# Patient Record
Sex: FEMALE | HISPANIC OR LATINO | Employment: FULL TIME | URBAN - METROPOLITAN AREA
[De-identification: names, ages, dates, MRNs, and addresses within clinical notes are randomized per-mention and may not be internally consistent; named-entity substitution may affect disease eponyms.]

---

## 2017-03-19 ENCOUNTER — APPOINTMENT (OUTPATIENT)
Dept: GENERAL RADIOLOGY | Facility: CLINIC | Age: 42
End: 2017-03-19
Attending: EMERGENCY MEDICINE

## 2017-03-19 ENCOUNTER — HOSPITAL ENCOUNTER (EMERGENCY)
Facility: CLINIC | Age: 42
Discharge: HOME | End: 2017-03-19
Attending: EMERGENCY MEDICINE | Admitting: EMERGENCY MEDICINE

## 2017-03-19 VITALS
DIASTOLIC BLOOD PRESSURE: 77 MMHG | OXYGEN SATURATION: 99 % | RESPIRATION RATE: 48 BRPM | SYSTOLIC BLOOD PRESSURE: 109 MMHG | TEMPERATURE: 101.3 F | HEART RATE: 107 BPM

## 2017-03-19 DIAGNOSIS — J06.9 VIRAL UPPER RESPIRATORY TRACT INFECTION: ICD-10-CM

## 2017-03-19 LAB
ALBUMIN SERPL-MCNC: 3.8 G/DL (ref 3.4–5)
ALBUMIN UR-MCNC: NEGATIVE MG/DL
ALP SERPL-CCNC: 53 U/L (ref 40–150)
ALT SERPL W P-5'-P-CCNC: 16 U/L (ref 0–50)
ANION GAP SERPL CALCULATED.3IONS-SCNC: 8 MMOL/L (ref 3–14)
APPEARANCE UR: CLEAR
AST SERPL W P-5'-P-CCNC: 15 U/L (ref 0–45)
BACTERIA #/AREA URNS HPF: ABNORMAL /HPF
BASOPHILS # BLD AUTO: 0 10E9/L (ref 0–0.2)
BASOPHILS NFR BLD AUTO: 0.3 %
BILIRUB SERPL-MCNC: 1.7 MG/DL (ref 0.2–1.3)
BILIRUB UR QL STRIP: NEGATIVE
BUN SERPL-MCNC: 7 MG/DL (ref 7–30)
CALCIUM SERPL-MCNC: 8.4 MG/DL (ref 8.5–10.1)
CHLORIDE SERPL-SCNC: 105 MMOL/L (ref 94–109)
CO2 SERPL-SCNC: 24 MMOL/L (ref 20–32)
COLOR UR AUTO: YELLOW
CREAT SERPL-MCNC: 0.6 MG/DL (ref 0.52–1.04)
DIFFERENTIAL METHOD BLD: ABNORMAL
EOSINOPHIL # BLD AUTO: 0 10E9/L (ref 0–0.7)
EOSINOPHIL NFR BLD AUTO: 0.1 %
ERYTHROCYTE [DISTWIDTH] IN BLOOD BY AUTOMATED COUNT: 18.6 % (ref 10–15)
FLUAV+FLUBV AG SPEC QL: NEGATIVE
FLUAV+FLUBV AG SPEC QL: NORMAL
GFR SERPL CREATININE-BSD FRML MDRD: ABNORMAL ML/MIN/1.7M2
GLUCOSE SERPL-MCNC: 98 MG/DL (ref 70–99)
GLUCOSE UR STRIP-MCNC: NEGATIVE MG/DL
HCT VFR BLD AUTO: 40.4 % (ref 35–47)
HGB BLD-MCNC: 13.1 G/DL (ref 11.7–15.7)
HGB UR QL STRIP: NEGATIVE
HYALINE CASTS #/AREA URNS LPF: 1 /LPF (ref 0–2)
IMM GRANULOCYTES # BLD: 0 10E9/L (ref 0–0.4)
IMM GRANULOCYTES NFR BLD: 0.4 %
KETONES UR STRIP-MCNC: NEGATIVE MG/DL
LACTATE BLD-SCNC: 1.3 MMOL/L (ref 0.7–2.1)
LEUKOCYTE ESTERASE UR QL STRIP: NEGATIVE
LYMPHOCYTES # BLD AUTO: 0.6 10E9/L (ref 0.8–5.3)
LYMPHOCYTES NFR BLD AUTO: 8.9 %
MCH RBC QN AUTO: 26.2 PG (ref 26.5–33)
MCHC RBC AUTO-ENTMCNC: 32.4 G/DL (ref 31.5–36.5)
MCV RBC AUTO: 81 FL (ref 78–100)
MONOCYTES # BLD AUTO: 0.3 10E9/L (ref 0–1.3)
MONOCYTES NFR BLD AUTO: 4.7 %
MUCOUS THREADS #/AREA URNS LPF: PRESENT /LPF
NEUTROPHILS # BLD AUTO: 6.2 10E9/L (ref 1.6–8.3)
NEUTROPHILS NFR BLD AUTO: 85.6 %
NITRATE UR QL: NEGATIVE
NRBC # BLD AUTO: 0 10*3/UL
NRBC BLD AUTO-RTO: 0 /100
PH UR STRIP: 8 PH (ref 5–7)
PLATELET # BLD AUTO: 204 10E9/L (ref 150–450)
POTASSIUM SERPL-SCNC: 3.6 MMOL/L (ref 3.4–5.3)
PROT SERPL-MCNC: 7 G/DL (ref 6.8–8.8)
RBC # BLD AUTO: 5 10E12/L (ref 3.8–5.2)
RBC #/AREA URNS AUTO: 2 /HPF (ref 0–2)
SODIUM SERPL-SCNC: 137 MMOL/L (ref 133–144)
SP GR UR STRIP: 1.01 (ref 1–1.03)
SPECIMEN SOURCE: NORMAL
SQUAMOUS #/AREA URNS AUTO: 1 /HPF (ref 0–1)
URN SPEC COLLECT METH UR: ABNORMAL
UROBILINOGEN UR STRIP-MCNC: 0 MG/DL (ref 0–2)
WBC # BLD AUTO: 7.2 10E9/L (ref 4–11)
WBC #/AREA URNS AUTO: <1 /HPF (ref 0–2)

## 2017-03-19 PROCEDURE — 85025 COMPLETE CBC W/AUTO DIFF WBC: CPT | Performed by: EMERGENCY MEDICINE

## 2017-03-19 PROCEDURE — 25000128 H RX IP 250 OP 636: Performed by: EMERGENCY MEDICINE

## 2017-03-19 PROCEDURE — 36415 COLL VENOUS BLD VENIPUNCTURE: CPT

## 2017-03-19 PROCEDURE — 71020 XR CHEST 2 VW: CPT

## 2017-03-19 PROCEDURE — 81001 URINALYSIS AUTO W/SCOPE: CPT | Performed by: EMERGENCY MEDICINE

## 2017-03-19 PROCEDURE — 87804 INFLUENZA ASSAY W/OPTIC: CPT | Performed by: EMERGENCY MEDICINE

## 2017-03-19 PROCEDURE — 83605 ASSAY OF LACTIC ACID: CPT | Performed by: EMERGENCY MEDICINE

## 2017-03-19 PROCEDURE — 96360 HYDRATION IV INFUSION INIT: CPT

## 2017-03-19 PROCEDURE — 80053 COMPREHEN METABOLIC PANEL: CPT | Performed by: EMERGENCY MEDICINE

## 2017-03-19 PROCEDURE — 87040 BLOOD CULTURE FOR BACTERIA: CPT | Performed by: EMERGENCY MEDICINE

## 2017-03-19 PROCEDURE — 99284 EMERGENCY DEPT VISIT MOD MDM: CPT | Mod: 25

## 2017-03-19 PROCEDURE — 25000132 ZZH RX MED GY IP 250 OP 250 PS 637: Performed by: EMERGENCY MEDICINE

## 2017-03-19 RX ORDER — BENZONATATE 200 MG/1
200 CAPSULE ORAL 3 TIMES DAILY PRN
Qty: 21 CAPSULE | Refills: 0 | Status: SHIPPED | OUTPATIENT
Start: 2017-03-19 | End: 2023-12-24

## 2017-03-19 RX ORDER — GUAIFENESIN/DEXTROMETHORPHAN 100-10MG/5
5 SYRUP ORAL 4 TIMES DAILY PRN
Qty: 560 ML | Refills: 0 | Status: SHIPPED | OUTPATIENT
Start: 2017-03-19 | End: 2023-12-24

## 2017-03-19 RX ORDER — SODIUM CHLORIDE 9 MG/ML
1000 INJECTION, SOLUTION INTRAVENOUS CONTINUOUS
Status: DISCONTINUED | OUTPATIENT
Start: 2017-03-19 | End: 2017-03-19 | Stop reason: HOSPADM

## 2017-03-19 RX ORDER — ACETAMINOPHEN 325 MG/1
975 TABLET ORAL ONCE
Status: COMPLETED | OUTPATIENT
Start: 2017-03-19 | End: 2017-03-19

## 2017-03-19 RX ORDER — LIDOCAINE 40 MG/G
CREAM TOPICAL
Status: DISCONTINUED | OUTPATIENT
Start: 2017-03-19 | End: 2017-03-19 | Stop reason: HOSPADM

## 2017-03-19 RX ORDER — LIDOCAINE 40 MG/G
CREAM TOPICAL
Status: DISCONTINUED | OUTPATIENT
Start: 2017-03-19 | End: 2017-03-19

## 2017-03-19 RX ADMIN — SODIUM CHLORIDE 1000 ML: 9 INJECTION, SOLUTION INTRAVENOUS at 15:44

## 2017-03-19 RX ADMIN — ACETAMINOPHEN 975 MG: 325 TABLET ORAL at 15:47

## 2017-03-19 ASSESSMENT — ENCOUNTER SYMPTOMS
SHORTNESS OF BREATH: 1
HEMATURIA: 0
BLOOD IN STOOL: 0
ABDOMINAL PAIN: 1
FEVER: 1
NAUSEA: 0
DIARRHEA: 0
DYSURIA: 0
VOMITING: 0
HEADACHES: 1

## 2017-03-19 NOTE — DISCHARGE INSTRUCTIONS
Enfermedad Respiratoria Viral [Uri, Viral Respiratory Illness, Adult, No Abx]  Usted tiene sulema enfermedad respiratoria de las vías superiores provocada por un virus. Esta enfermedad es contagiosa marie los primeros días. Se transmite por el aire, por la tos o el estornudo de la persona afectada, o por contacto directo con annmarie persona (si hamlet toca a la persona enferma y después se toca los ojos, la nariz o la boca). La mayoría de las enfermedades virales mejoran en 7-10 días con reposo y simples petros caseros; aunque, en ocasiones, la enfermedad puede durar varias semanas. Los antibióticos son ineficaces contra los virus, por lo que generalmente no se recetan para esta enfermedad.    Cuidados En [place]:  1) Si los síntomas son severos, descanse en martinez casa marie los primeros 2 ó 3 días. Cuando reanude frank actividades, evite cansarse demasiado.  2) Evite exponerse al humo de cigarrillo (suyo o de otras personas).  3) Puede usar Tylenol (acetaminofén) o ibuprofeno (Motrin o Advil) para controlar la fiebre o el dolor muscular y el dolor de thomas. (La aspirina no debe usarse nunca en personas menores de 18 años enfermas con fiebre, ya que puede causar daños graves al hígado.)  4) Es posible que tenga poco apetito, por lo que sulema dieta ligera es adecuada. Para evitar la deshidratación, peg entre 6 y 8 vasos de líquido cada día (agua, refrescos, jugos de fruta, té, sopa etc.). La abundancia de líquido ayuda a desprender las secreciones de la nariz y los pulmones.  5) Los medicamentos sin receta para el resfriado no acortarán la duración de la enfermedad, rita pueden ser útiles para aliviar los siguientes síntomas: tos (Robitussin MD); dolor de garganta (Chloraseptic en comprimidos o spray); congestión nasal y de los senos paranasales (Actifed, Sudafed o Chlortrimeton).  Nissa sulema VISITA DE CONTROL a martinez médico, o según le indiquen, si no se siente mejor marie la semana próxima.  Busque Prontamente Atención  Médica  si algo de lo siguiente ocurre:  -- Tos con esputo de color (mucosidad) o con alpesh.  -- Dolor en el pecho, falta de aire, silbidos o dificultad para respirar.  -- Dolor de thomas yuliya, dolor en la zoila, el angel o los oídos.  -- Fiebre superior a los 100.4  F (38.0  C) marie más de hollie días.  -- Incapacidad de tragar a causa del dolor de garganta.    [id number] [employer]. [address] [place] [address], [place] [address]. Todos los derechos reservados. Esta información no pretende sustituir la atención médica profesional. Sólo martinez médico puede diagnosticar y tratar un problema de patience.

## 2017-03-19 NOTE — ED PROVIDER NOTES
History     Chief Complaint:  Shortness of breath    HPI completed through family member translating at bedside.   HPI   Mitali Alexander is a 41 year old female who presents to the emergency department today for evaluation of abdominal pain that began earlier today. Patient also reports fever, headache, and shortness of breath that began concurrently with worsening of abdominal pain. Patient reports recent visit to [facility] for evaluation of these symptoms; family notes that patient has had extensive evaluation of this abdominal pain which has been present since December 2016. Patient reports ill contact with sick daughter who had vomiting and dizziness for one day. Patient took Dayquil for symptomatic treatment earlier but had no relief with this. Patient reports no nausea, vomiting, or bowel and urinary symptoms. No other concerns were voiced at this time. Of note, Resp rate was 48 in triage, but RN notes that patient was hyperventilating.     Also of note, patient had evaluation at [facility] on Feb 20th, during which she had normal Pelvic US, normal CBC, and normal CMP.     Allergies:  No Known Drug Allergies    Medications:    Tylenol   Benadryl     Past Medical History:    History reviewed. No pertinent past medical history.    Past Surgical History:    Ovarian cyst removal     Family History:    Mother - HTN    Social History:  The patient was accompanied to the ED by family.  Smoking Status: Negative  Alcohol Use: Negative  Marital Status:   [2]    Review of Systems   Constitutional: Positive for fever.   Respiratory: Positive for shortness of breath.    Gastrointestinal: Positive for abdominal pain. Negative for blood in stool, diarrhea, nausea and vomiting.   Genitourinary: Negative for dysuria and hematuria.   Neurological: Positive for headaches.   All other systems reviewed and are negative.    Physical Exam     Patient Vitals for the past 24 hrs:   BP Temp Temp src Pulse Heart  Rate Resp SpO2   03/19/17 1530 109/77 - - - - - 99 %   03/19/17 1515 - - - - - - 98 %   03/19/17 1504 115/80 101.3  F (38.5  C) Oral 107 107 (!) 48 99 %     Physical Exam  General: Patient is alert and cooperative.  HENT: no significant nasal congestion or drainage. Normal posterior pharynx. Moist oral mucosa.  Eyes: EOMI, PERRLA.  Normal conjunctiva.  Neck: Normal range of motion. Neck supple.  No meningismus.   Cardiovascular: Normal rate, regular rhythm and normal heart sounds.   Pulmonary/Chest: Effort normal.  No wheezing or crackles.  Abdominal: Soft. Patient exhibits no distension and no mass. There is no tenderness.       Musculoskeletal: Normal range of motion. Patient exhibits no edema and no tenderness.   Neurological: Patient  is alert and oriented.   Skin: Skin is warm and dry. No rash noted.   Psychiatric: Patient has an anxious mood and affect. Normal behavior and judgement.    Emergency Department Course     Imaging:  Radiology findings were communicated with the patient who voiced understanding of the findings.    Chest xray 2 views:  Pending   Reading per radiology    Laboratory:  Laboratory findings were communicated with the patient who voiced understanding of the findings.  CBC: WBC 7.2, HGB 13.1,    CMP: Calcium: 8.4(L), Bilirubin: 1.7(H), Creatinine 0.60  UA: Pending  Lactic acid: 1.3    Influenza A/B antigen: Negative    Blood culture: Pending    Interventions:  1544 NS 1000 mL IV  1547 Tylenol 975 mg Oral      Emergency Department Course:  Nursing notes and vitals reviewed.  I performed an exam of the patient as documented above.   IV was inserted and blood was drawn for laboratory testing, results above.  The patient provided a urine sample here in the emergency department. This was sent for laboratory testing, findings above.  The patient was sent for a chest xray while in the emergency department, results above.     At 1604 the patient's family was rechecked and updated on  available lab results.     Impression & Plan      Medical Decision Makin y.o  female developed and acute febrile illness today with symptoms of cough, headache, and shortness of breath.  No recent foreign travel history.  A couple family members have had recent URI's. She was tachypneic and obviously anxious upon arrival, but has settled down and now has an unremarkable examination.  Work up has shown no evidence of influenza or pneumonia or UTI.  Clinically, there is concern for a serious bacterial illness, such as meningitis.  I believe this represents a viral respiratory illness and there is no indication for antibiotics or other interventions.  A secondary issue for her is that of chronic abdominal pain, since last /early winter.  She has had fairly recent negative pelvic US and labs.  Given her present benign non tender abdominal examination, there is no justification for emergency CT scanning.  I've recommended symptomatic treatment for now with follow up in primary clinic both for recheck of current symptoms and the ongoing abdominal discomfort.      Diagnosis:  Viral respiratory infection.     Disposition:   home    Scribe Disclosure:  I, Kwaku Jackson, am serving as a scribe at 3:01 PM on 3/19/2017 to document services personally performed by Supa Magana MD, based on my observations and the provider's statements to me.  [facility] EMERGENCY DEPARTMENT       Supa Magana MD  [phone]

## 2017-03-19 NOTE — ED AVS SNAPSHOT
[facility] Emergency Department    [address]    [facility] [address]    Phone:  [phone]    Fax:  [phone]                                       Mitali Alexander   MRN: [id number]    Department:  [facility] Emergency Department   Date of Visit:  3/19/2017           After Visit Summary Signature Page     I have received my discharge instructions, and my questions have been answered. I have discussed any challenges I see with this plan with the nurse or doctor.    ..........................................................................................................................................  Patient/Patient Representative Signature      ..........................................................................................................................................  Patient Representative Print Name and Relationship to Patient    ..................................................               ................................................  Date                                            Time    ..........................................................................................................................................  Reviewed by Signature/Title    ...................................................              ..............................................  Date                                                            Time

## 2017-03-19 NOTE — ED AVS SNAPSHOT
[facility] Emergency Department    [address]    [facility] [address]    Phone:  [phone]    Fax:  [phone]                                       Mitali Alexander   MRN: [id number]    Department:  [facility] Emergency Department   Date of Visit:  3/19/2017           Patient Information     Date Of Birth          1975        Your diagnoses for this visit were:     Viral upper respiratory tract infection        You were seen by Supa Magana MD.      Follow-up Information     Follow up with Primary care clinic.    Why:  for re-evaluation of your symptoms if not improving in 3-5 days        Discharge Instructions         Enfermedad Respiratoria Viral [Uri, Viral Respiratory Illness, Adult, No Abx]  Usted tiene sulema enfermedad respiratoria de las vías superiores provocada por un virus. Esta enfermedad es contagiosa marie los primeros días. Se transmite por el aire, por la tos o el estornudo de la persona afectada, o por contacto directo con annmarie persona (si hamlet toca a la persona enferma y después se toca los ojos, la nariz o la boca). La mayoría de las enfermedades virales mejoran en 7-10 días con reposo y simples petros caseros; aunque, en ocasiones, la enfermedad puede durar varias semanas. Los antibióticos son ineficaces contra los virus, por lo que generalmente no se recetan para esta enfermedad.    Cuidados En [place]:  1) Si los síntomas son severos, descanse en martinez casa marie los primeros 2 ó 3 días. Cuando reanude frank actividades, evite cansarse demasiado.  2) Evite exponerse al humo de cigarrillo (suyo o de otras personas).  3) Puede usar Tylenol (acetaminofén) o ibuprofeno (Motrin o Advil) para controlar la fiebre o el dolor muscular y el dolor de thomas. (La aspirina no debe usarse nunca en personas menores de 18 años enfermas con fiebre, ya que puede causar daños graves al hígado.)  4) Es posible que tenga poco apetito, por lo que sulema dieta ligera es  adecuada. Para evitar la deshidratación, peg entre 6 y 8 vasos de líquido cada día (agua, refrescos, jugos de fruta, té, sopa etc.). La abundancia de líquido ayuda a desprender las secreciones de la nariz y los pulmones.  5) Los medicamentos sin receta para el resfriado no acortarán la duración de la enfermedad, rita pueden ser útiles para aliviar los siguientes síntomas: tos (Robitussin MD); dolor de garganta (Chloraseptic en comprimidos o spray); congestión nasal y de los senos paranasales (Actifed, Sudafed o Chlortrimeton).  Nissa sulema VISITA DE CONTROL a martinez médico, o según le indiquen, si no se siente mejor marie la semana próxima.  Busque Prontamente Atención Médica  si algo de lo siguiente ocurre:  -- Tos con esputo de color (mucosidad) o con alpesh.  -- Dolor en el pecho, falta de aire, silbidos o dificultad para respirar.  -- Dolor de thomas yuliya, dolor en la zoila, el angel o los oídos.  -- Fiebre superior a los 100.4  F (38.0  C) marie más de hollie días.  -- Incapacidad de tragar a causa del dolor de garganta.    [id number] [employer]. [address] [place] [address]. Todos los derechos reservados. Esta información no pretende sustituir la atención médica profesional. Sólo martinez médico puede diagnosticar y tratar un problema de patience.          24 Hour Appointment Hotline       To make an appointment at any [facility], call [phone] ([phone]). If you don't have a family doctor or clinic, we will help you find one. [facility] clinics are conveniently located to serve the needs of you and your family.             Review of your medicines      START taking        Dose / Directions Last dose taken    benzonatate 200 MG capsule   Commonly known as:  TESSALON   Dose:  200 mg   Quantity:  21 capsule        Take 1 capsule (200 mg) by mouth 3 times daily as needed for cough   Refills:  0        guaiFENesin-dextromethorphan 100-10 MG/5ML syrup   Commonly known as:  ROBITUSSIN  DM   Dose:  5 mL   Quantity:  560 mL        Take 5 mLs by mouth 4 times daily as needed for cough   Refills:  0          Our records show that you are taking the medicines listed below. If these are incorrect, please call your family doctor or clinic.        Dose / Directions Last dose taken    BENADRYL 25 MG tablet   Generic drug:  diphenhydrAMINE        1 TABLET EVERY 4 TO 6 HOURS AS NEEDED   Refills:  0        PRENATAL VITAMIN TABS   OR   Quantity:  30        1 TABLET DAILY   Refills:  0        TYLENOL EXTRA STRENGTH PO        1 TABLET EVERY 4 HOURS AS NEEDED   Refills:  0                Prescriptions were sent or printed at these locations (2 Prescriptions)                   Other Prescriptions                Printed at Department/Unit printer (2 of 2)         benzonatate (TESSALON) 200 MG capsule               guaiFENesin-dextromethorphan (ROBITUSSIN DM) 100-10 MG/5ML syrup                Procedures and tests performed during your visit     Blood culture    CBC with platelets differential    Cardiac Continuous Monitoring    Comprehensive metabolic panel    Influenza A/B antigen    Lactic acid whole blood    Peripheral IV: Standard    Pulse oximetry nursing    UA with Microscopic    XR Chest 2 Views      Orders Needing Specimen Collection     None      Pending Results     Date and Time Order Name Status Description    3/19/2017 1517 XR Chest 2 Views Preliminary     3/19/2017 1517 Blood culture In process             Pending Culture Results     Date and Time Order Name Status Description    3/19/2017 1517 Blood culture In process              Test Results from your hospital stay     3/19/2017  3:51 PM - Interface, FlipKey Results      Component Results     Component Value Ref Range & Units Status    Sodium 137 133 - 144 mmol/L Final    Potassium 3.6 3.4 - 5.3 mmol/L Final    Chloride 105 94 - 109 mmol/L Final    Carbon Dioxide 24 20 - 32 mmol/L Final    Anion Gap 8 3 - 14 mmol/L Final    Glucose 98 70 - 99 mg/dL  Final    Urea Nitrogen 7 7 - 30 mg/dL Final    Creatinine 0.60 0.52 - 1.04 mg/dL Final    GFR Estimate >90  Non  GFR Calc   >60 mL/min/1.7m2 Final    GFR Estimate If Black >90   GFR Calc   >60 mL/min/1.7m2 Final    Calcium 8.4 (L) 8.5 - 10.1 mg/dL Final    Bilirubin Total 1.7 (H) 0.2 - 1.3 mg/dL Final    Albumin 3.8 3.4 - 5.0 g/dL Final    Protein Total 7.0 6.8 - 8.8 g/dL Final    Alkaline Phosphatase 53 40 - 150 U/L Final    ALT 16 0 - 50 U/L Final    AST 15 0 - 45 U/L Final         3/19/2017  3:35 PM - Interface, Flexilab Results      Component Results     Component Value Ref Range & Units Status    WBC 7.2 4.0 - 11.0 10e9/L Final    RBC Count 5.00 3.8 - 5.2 10e12/L Final    Hemoglobin 13.1 11.7 - 15.7 g/dL Final    Hematocrit 40.4 35.0 - 47.0 % Final    MCV 81 78 - 100 fl Final    MCH 26.2 (L) 26.5 - 33.0 pg Final    MCHC 32.4 31.5 - 36.5 g/dL Final    RDW 18.6 (H) 10.0 - 15.0 % Final    Platelet Count 204 150 - 450 10e9/L Final    Diff Method Automated Method  Final    % Neutrophils 85.6 % Final    % Lymphocytes 8.9 % Final    % Monocytes 4.7 % Final    % Eosinophils 0.1 % Final    % Basophils 0.3 % Final    % Immature Granulocytes 0.4 % Final    Nucleated RBCs 0 0 /100 Final    Absolute Neutrophil 6.2 1.6 - 8.3 10e9/L Final    Absolute Lymphocytes 0.6 (L) 0.8 - 5.3 10e9/L Final    Absolute Monocytes 0.3 0.0 - 1.3 10e9/L Final    Absolute Eosinophils 0.0 0.0 - 0.7 10e9/L Final    Absolute Basophils 0.0 0.0 - 0.2 10e9/L Final    Abs Immature Granulocytes 0.0 0 - 0.4 10e9/L Final    Absolute Nucleated RBC 0.0  Final         3/19/2017  3:43 PM - Interface, Flexilab Results      Component Results     Component Value Ref Range & Units Status    Lactic Acid 1.3 0.7 - 2.1 mmol/L Final         3/19/2017  4:35 PM - Interface, Flexilab Results      Component Results     Component Value Ref Range & Units Status    Color Urine Yellow  Final    Appearance Urine Clear  Final    Glucose Urine  Negative NEG mg/dL Final    Bilirubin Urine Negative NEG Final    Ketones Urine Negative NEG mg/dL Final    Specific Gravity Urine 1.009 1.003 - 1.035 Final    Blood Urine Negative NEG Final    pH Urine 8.0 (H) 5.0 - 7.0 pH Final    Protein Albumin Urine Negative NEG mg/dL Final    Urobilinogen mg/dL 0.0 0.0 - 2.0 mg/dL Final    Nitrite Urine Negative NEG Final    Leukocyte Esterase Urine Negative NEG Final    Source Midstream Urine  Final    WBC Urine <1 0 - 2 /HPF Final    RBC Urine 2 0 - 2 /HPF Final    Bacteria Urine Few (A) NEG /HPF Final    Squamous Epithelial /HPF Urine 1 0 - 1 /HPF Final    Mucous Urine Present (A) NEG /LPF Final    Hyaline Casts 1 0 - 2 /LPF Final         3/19/2017  3:36 PM - Interface, Flexilab Results         3/19/2017  4:04 PM - Interface, Flexilab Results      Component Results     Component Value Ref Range & Units Status    Influenza A/B Agn Specimen Nasal  Final    Influenza A Negative NEG Final    Influenza B  NEG Final    Negative   Test results must be correlated with clinical data. If necessary, results   should be confirmed by a molecular assay or viral culture.           3/19/2017  4:09 PM - Interface, Radiant Ib      Narrative     XR CHEST 2 VW   3/19/2017 4:07 PM     HISTORY: fever, sob    COMPARISON: None.    FINDINGS: Patient is taking a shallow inspiration. This is causing  some vascular crowding. The heart is negative.  The lungs are clear.  The pulmonary vasculature is normal.  The bones and soft tissues are  unremarkable.        Impression     IMPRESSION: No focal alveolar-type infiltrates are identified.                    Clinical Quality Measure: Blood Pressure Screening     Your blood pressure was checked while you were in the emergency department today. The last reading we obtained was  BP: 109/77 . Please read the guidelines below about what these numbers mean and what you should do about them.  If your systolic blood pressure (the top number) is less than 120 and  "your diastolic blood pressure (the bottom number) is less than 80, then your blood pressure is normal. There is nothing more that you need to do about it.  If your systolic blood pressure (the top number) is 120-139 or your diastolic blood pressure (the bottom number) is 80-89, your blood pressure may be higher than it should be. You should have your blood pressure rechecked within a year by a primary care provider.  If your systolic blood pressure (the top number) is 140 or greater or your diastolic blood pressure (the bottom number) is 90 or greater, you may have high blood pressure. High blood pressure is treatable, but if left untreated over time it can put you at risk for heart attack, stroke, or kidney failure. You should have your blood pressure rechecked by a primary care provider within the next 4 weeks.  If your provider in the emergency department today gave you specific instructions to follow-up with your doctor or provider even sooner than that, you should follow that instruction and not wait for up to 4 weeks for your follow-up visit.        Thank you for choosing [facility]       Thank you for choosing [facility] for your care. Our goal is always to provide you with excellent care. Hearing back from our patients is one way we can continue to improve our services. Please take a few minutes to complete the written survey that you may receive in the mail after you visit with us. Thank you!        [employer] Information     [employer] lets you send messages to your doctor, view your test results, renew your prescriptions, schedule appointments and more. To sign up, go to [email or web address] . Click on \"Log in\" on the left side of the screen, which will take you to the Welcome page. Then click on \"Sign up Now\" on the right side of the page.     You will be asked to enter the access code listed below, as well as some personal information. Please follow the directions to create your username and password.   "   Your access code is: [email or web address]  Expires: 2017  4:59 PM     Your access code will  in 90 days. If you need help or a new code, please call your [facility] or [phone].        Care EveryWhere ID     This is your Care EveryWhere ID. This could be used by other organizations to access your [facility] medical records  [id number]        After Visit Summary       This is your record. Keep this with you and show to your community pharmacist(s) and doctor(s) at your next visit.

## 2017-03-25 LAB
BACTERIA SPEC CULT: NO GROWTH
Lab: NORMAL
MICRO REPORT STATUS: NORMAL
SPECIMEN SOURCE: NORMAL

## 2017-03-31 ENCOUNTER — APPOINTMENT (OUTPATIENT)
Dept: CT IMAGING | Facility: CLINIC | Age: 42
End: 2017-03-31
Attending: EMERGENCY MEDICINE

## 2017-03-31 ENCOUNTER — HOSPITAL ENCOUNTER (EMERGENCY)
Facility: CLINIC | Age: 42
Discharge: HOME | End: 2017-03-31
Attending: EMERGENCY MEDICINE | Admitting: EMERGENCY MEDICINE

## 2017-03-31 VITALS
OXYGEN SATURATION: 97 % | SYSTOLIC BLOOD PRESSURE: 98 MMHG | RESPIRATION RATE: 18 BRPM | DIASTOLIC BLOOD PRESSURE: 65 MMHG | TEMPERATURE: 99.7 F

## 2017-03-31 DIAGNOSIS — R10.2 PELVIC PAIN: ICD-10-CM

## 2017-03-31 DIAGNOSIS — N73.0 PID (ACUTE PELVIC INFLAMMATORY DISEASE): ICD-10-CM

## 2017-03-31 LAB
ALBUMIN SERPL-MCNC: 3.7 G/DL (ref 3.4–5)
ALBUMIN UR-MCNC: NEGATIVE MG/DL
ALP SERPL-CCNC: 53 U/L (ref 40–150)
ALT SERPL W P-5'-P-CCNC: 17 U/L (ref 0–50)
ANION GAP SERPL CALCULATED.3IONS-SCNC: 6 MMOL/L (ref 3–14)
APPEARANCE UR: CLEAR
AST SERPL W P-5'-P-CCNC: 18 U/L (ref 0–45)
B-HCG SERPL-ACNC: <1 IU/L (ref 0–5)
BACTERIA #/AREA URNS HPF: ABNORMAL /HPF
BASOPHILS # BLD AUTO: 0 10E9/L (ref 0–0.2)
BASOPHILS NFR BLD AUTO: 0.6 %
BILIRUB SERPL-MCNC: 0.8 MG/DL (ref 0.2–1.3)
BILIRUB UR QL STRIP: NEGATIVE
BUN SERPL-MCNC: 6 MG/DL (ref 7–30)
CALCIUM SERPL-MCNC: 8.6 MG/DL (ref 8.5–10.1)
CHLORIDE SERPL-SCNC: 106 MMOL/L (ref 94–109)
CO2 SERPL-SCNC: 28 MMOL/L (ref 20–32)
COLOR UR AUTO: YELLOW
CREAT SERPL-MCNC: 0.6 MG/DL (ref 0.52–1.04)
DIFFERENTIAL METHOD BLD: ABNORMAL
EOSINOPHIL # BLD AUTO: 0 10E9/L (ref 0–0.7)
EOSINOPHIL NFR BLD AUTO: 0.6 %
ERYTHROCYTE [DISTWIDTH] IN BLOOD BY AUTOMATED COUNT: 17.8 % (ref 10–15)
GFR SERPL CREATININE-BSD FRML MDRD: ABNORMAL ML/MIN/1.7M2
GLUCOSE SERPL-MCNC: 90 MG/DL (ref 70–99)
GLUCOSE UR STRIP-MCNC: NEGATIVE MG/DL
HCT VFR BLD AUTO: 37.2 % (ref 35–47)
HGB BLD-MCNC: 12.3 G/DL (ref 11.7–15.7)
HGB UR QL STRIP: NEGATIVE
HYALINE CASTS #/AREA URNS LPF: 1 /LPF (ref 0–2)
IMM GRANULOCYTES # BLD: 0 10E9/L (ref 0–0.4)
IMM GRANULOCYTES NFR BLD: 0.5 %
KETONES UR STRIP-MCNC: NEGATIVE MG/DL
LEUKOCYTE ESTERASE UR QL STRIP: NEGATIVE
LIPASE SERPL-CCNC: 145 U/L (ref 73–393)
LYMPHOCYTES # BLD AUTO: 0.8 10E9/L (ref 0.8–5.3)
LYMPHOCYTES NFR BLD AUTO: 12.7 %
MCH RBC QN AUTO: 27.1 PG (ref 26.5–33)
MCHC RBC AUTO-ENTMCNC: 33.1 G/DL (ref 31.5–36.5)
MCV RBC AUTO: 82 FL (ref 78–100)
MONOCYTES # BLD AUTO: 0.8 10E9/L (ref 0–1.3)
MONOCYTES NFR BLD AUTO: 12.4 %
MUCOUS THREADS #/AREA URNS LPF: PRESENT /LPF
NEUTROPHILS # BLD AUTO: 4.7 10E9/L (ref 1.6–8.3)
NEUTROPHILS NFR BLD AUTO: 73.2 %
NITRATE UR QL: NEGATIVE
NRBC # BLD AUTO: 0 10*3/UL
NRBC BLD AUTO-RTO: 0 /100
PH UR STRIP: 8 PH (ref 5–7)
PLATELET # BLD AUTO: 266 10E9/L (ref 150–450)
POTASSIUM SERPL-SCNC: 3.6 MMOL/L (ref 3.4–5.3)
PROT SERPL-MCNC: 6.9 G/DL (ref 6.8–8.8)
RBC # BLD AUTO: 4.54 10E12/L (ref 3.8–5.2)
RBC #/AREA URNS AUTO: <1 /HPF (ref 0–2)
SODIUM SERPL-SCNC: 140 MMOL/L (ref 133–144)
SP GR UR STRIP: 1.01 (ref 1–1.03)
SQUAMOUS #/AREA URNS AUTO: 3 /HPF (ref 0–1)
URN SPEC COLLECT METH UR: ABNORMAL
UROBILINOGEN UR STRIP-MCNC: 0 MG/DL (ref 0–2)
WBC # BLD AUTO: 6.5 10E9/L (ref 4–11)
WBC #/AREA URNS AUTO: 1 /HPF (ref 0–2)

## 2017-03-31 PROCEDURE — 25000128 H RX IP 250 OP 636: Performed by: EMERGENCY MEDICINE

## 2017-03-31 PROCEDURE — 96374 THER/PROPH/DIAG INJ IV PUSH: CPT

## 2017-03-31 PROCEDURE — 83690 ASSAY OF LIPASE: CPT | Performed by: EMERGENCY MEDICINE

## 2017-03-31 PROCEDURE — 99285 EMERGENCY DEPT VISIT HI MDM: CPT | Mod: 25

## 2017-03-31 PROCEDURE — 84702 CHORIONIC GONADOTROPIN TEST: CPT | Performed by: EMERGENCY MEDICINE

## 2017-03-31 PROCEDURE — 96361 HYDRATE IV INFUSION ADD-ON: CPT

## 2017-03-31 PROCEDURE — 25500064 ZZH RX 255 OP 636: Performed by: EMERGENCY MEDICINE

## 2017-03-31 PROCEDURE — 85025 COMPLETE CBC W/AUTO DIFF WBC: CPT | Performed by: EMERGENCY MEDICINE

## 2017-03-31 PROCEDURE — 74177 CT ABD & PELVIS W/CONTRAST: CPT

## 2017-03-31 PROCEDURE — 96372 THER/PROPH/DIAG INJ SC/IM: CPT

## 2017-03-31 PROCEDURE — 80053 COMPREHEN METABOLIC PANEL: CPT | Performed by: EMERGENCY MEDICINE

## 2017-03-31 PROCEDURE — 81001 URINALYSIS AUTO W/SCOPE: CPT | Performed by: EMERGENCY MEDICINE

## 2017-03-31 RX ORDER — SODIUM CHLORIDE 9 MG/ML
1000 INJECTION, SOLUTION INTRAVENOUS CONTINUOUS
Status: DISCONTINUED | OUTPATIENT
Start: 2017-03-31 | End: 2017-04-01 | Stop reason: HOSPADM

## 2017-03-31 RX ORDER — HYDROMORPHONE HYDROCHLORIDE 1 MG/ML
.5-1 INJECTION, SOLUTION INTRAMUSCULAR; INTRAVENOUS; SUBCUTANEOUS
Status: DISCONTINUED | OUTPATIENT
Start: 2017-03-31 | End: 2017-04-01 | Stop reason: HOSPADM

## 2017-03-31 RX ORDER — CEFTRIAXONE SODIUM 1 G
250 VIAL (EA) INJECTION ONCE
Status: COMPLETED | OUTPATIENT
Start: 2017-03-31 | End: 2017-03-31

## 2017-03-31 RX ORDER — LIDOCAINE 40 MG/G
CREAM TOPICAL
Status: DISCONTINUED | OUTPATIENT
Start: 2017-03-31 | End: 2017-04-01 | Stop reason: HOSPADM

## 2017-03-31 RX ORDER — LIDOCAINE HYDROCHLORIDE 10 MG/ML
INJECTION, SOLUTION INFILTRATION; PERINEURAL
Status: DISCONTINUED
Start: 2017-03-31 | End: 2017-04-01 | Stop reason: HOSPADM

## 2017-03-31 RX ORDER — METRONIDAZOLE 500 MG/1
500 TABLET ORAL 2 TIMES DAILY
Qty: 28 TABLET | Refills: 0 | Status: SHIPPED | OUTPATIENT
Start: 2017-03-31 | End: 2017-04-14

## 2017-03-31 RX ORDER — IOPAMIDOL 755 MG/ML
500 INJECTION, SOLUTION INTRAVASCULAR ONCE
Status: COMPLETED | OUTPATIENT
Start: 2017-03-31 | End: 2017-03-31

## 2017-03-31 RX ADMIN — CEFTRIAXONE 250 MG: 1 INJECTION, POWDER, FOR SOLUTION INTRAMUSCULAR; INTRAVENOUS at 21:51

## 2017-03-31 RX ADMIN — SODIUM CHLORIDE 1000 ML: 9 INJECTION, SOLUTION INTRAVENOUS at 19:12

## 2017-03-31 RX ADMIN — SODIUM CHLORIDE 59 ML: 9 INJECTION, SOLUTION INTRAVENOUS at 20:30

## 2017-03-31 RX ADMIN — HYDROMORPHONE HYDROCHLORIDE 0.5 MG: 1 INJECTION, SOLUTION INTRAMUSCULAR; INTRAVENOUS; SUBCUTANEOUS at 19:12

## 2017-03-31 RX ADMIN — IOPAMIDOL 81 ML: 755 INJECTION, SOLUTION INTRAVENOUS at 20:30

## 2017-03-31 ASSESSMENT — ENCOUNTER SYMPTOMS
FREQUENCY: 0
DYSURIA: 0
HEMATURIA: 0
DIARRHEA: 0
VOMITING: 0
NAUSEA: 0
ABDOMINAL PAIN: 1

## 2017-03-31 NOTE — ED NOTES
Pt has lower abdominal pain since December and reports vaginal pain.  She has seen MD about this problem before in multiple places.

## 2017-03-31 NOTE — ED AVS SNAPSHOT
[facility] Emergency Department    [address]    [facility] [address]    Phone:  [phone]    Fax:  [phone]                                       Mitali Alexander   MRN: [id number]    Department:  [facility] Emergency Department   Date of Visit:  3/31/2017           After Visit Summary Signature Page     I have received my discharge instructions, and my questions have been answered. I have discussed any challenges I see with this plan with the nurse or doctor.    ..........................................................................................................................................  Patient/Patient Representative Signature      ..........................................................................................................................................  Patient Representative Print Name and Relationship to Patient    ..................................................               ................................................  Date                                            Time    ..........................................................................................................................................  Reviewed by Signature/Title    ...................................................              ..............................................  Date                                                            Time

## 2017-03-31 NOTE — ED PROVIDER NOTES
History     Chief Complaint:  Abdominal Pain     HPI The history is obtained through Senegalese language interpretations provided by the patient's daughters.     Mitali Alexander is a 41 year old female who presents accompanied by her family for evaluation of abdominal pain. In December of 2016, the patient started to develop cramping lower abdominal pain and vaginal pain. Since then, the patient has been struggling with the pain constantly and she has had numerous previous evaluations regarding these symptoms. She reports that she has previously had blood work, urine analysis, pelvic exams with cultures, and ultrasounds performed, all of which have not returned with any significant findings. Her ultrasound was performed through [facility], and she reports that she has not had a CT scan. Details regarding her most recent ultrasound are as below. Recently, the patient's pain worsened and she has had difficulty walking due to the severity of her pain, prompting her to seek evaluation in the ED tonight. Currently in the ED, the patient rates her pain at a severity of 7/10. Otherwise, she has not had any nausea, vomiting, diarrhea, dysuria, hematuria, or urinary frequency in association with her current symptoms. Her last menstrual period was about a week ago, and have generally been normal.     US Pelvic Complete w EV: 2/20/2017:  COMPARISON:  CT dated 10/04/2004  TECHNIQUE:  Transabdominal and transvaginal imaging was performed.  FINDINGS:    Uterus: Measures 9.4 x 5.5 x 5.8 cm. Appears unremarkable.  Endometrium: Measures up to 1.1 cm in thickness.    Right Ovary: Not visualized.  Left Ovary: Measures 3.0 x 1.8 x 3.4 cm and appears unremarkable  Left Ovary Blood Flow: Limited visualization demonstrates likely some flow.  Free Fluid: no significant free fluid.  IMPRESSION:   1. Right ovary not seen.  2. Limited views of the left ovary do not demonstrate any definite abnormality. Blood flow to the left ovary was  difficult to evaluate due to the ovary's position.  3. Remainder negative.    Allergies:  NKDA     Medications:    benzonatate (TESSALON) 200 MG capsule  guaiFENesin-dextromethorphan (ROBITUSSIN DM) 100-10 MG/5ML syrup  TYLENOL EXTRA STRENGTH OR  BENADRYL 25 MG OR TABS  PRENATAL VITAMIN TABS      Past Medical History:    Ovarian cyst     Past Surgical History:    Right ovarian cyst removed     Family History:    Hypertension - Mother     Social History:  Tobacco use:    Never smoker  Alcohol use:    Negative  Marital status:       Accompanied to ED by:  Family     Review of Systems   Gastrointestinal: Positive for abdominal pain. Negative for diarrhea, nausea and vomiting.   Genitourinary: Positive for vaginal pain. Negative for dysuria, frequency and hematuria.   All other systems reviewed and are negative.    Physical Exam   First Vitals:  Heart Rate: 86  Temp: 99.7  F (37.6  C)  Resp: 18  SpO2: 99 %      Physical Exam  General: Patient is alert and interactive when I enter the room  Head:  The scalp, face, and head appear normal  Eyes:  The pupils are equal, round, and reactive to light    Conjunctivae and sclerae are normal  ENT:    External acoustic canals are normal    The oropharynx is normal without erythema.     Uvula is in the midline    Mucous membranes are moist.   Neck:  Normal range of motion  CV:  Regular rate. S1/S2. No murmurs.   Resp:  Lungs are clear without wheezes or rales. No distress  GI:  Lower abdominal tenderness to palpation and left lower quadrant is minimally tender to palpation.  Other quadrants are examined in    detail without pain or significant findings.     Abdomen is soft, no rigidity, guarding, or rebound    No distension. No bruising is noted in the flanks or anteriorly.    No definite abdominal masses.     No palpable hernias noted.    :  Cervical motion tenderness. No masses, no abnl discharge  MS:  Normal tone. Joints grossly normal without effusions.     No  asymmetric leg swelling, calf or thigh tenderness.      Normal motor assessment of all extremities.  Skin:  See above, no rash or lesions noted. Normal capillary refill noted  Neuro: Speech is normal and fluent. Face is symmetric.     Moving all extremities well.   Psych:  Awake. Alert.  Normal affect.  Appropriate interactions.  Lymph: No anterior cervical lymphadenopathy noted    Emergency Department Course     Imaging:  Radiographic findings were communicated with the patient and family who voiced understanding of the findings.    Abd/Pelvis CT, IV Contrast Only Trauma / AAA:  IMPRESSION:  Unremarkable CT scan of the abdomen and pelvis.  Per radiology.     Laboratory:  CBC: WNL (WBC 6.5, HGB 12.3, )    CMP: BUN 6 low, o/w WNL (Creatinine 0.60)  Lipase: 145   HCG Quantitative Pregnancy: <1   UA with Microscopic: pH 8.0 high, Few bacteria, Squamous epithelial 3 high, Mucous present, o/w Negative     Interventions:  1912 NS 1,000 mL IV  1912 Dilaudid 0.5 mg IV     Emergency Department Course:  Nursing notes and vitals reviewed.  1829: I performed an exam of the patient as documented above.     2049: I updated and reassessed the patient.     2120: Pelvic exam performed. The patient declined cultures.     I personally reviewed the laboratory results with the Patient and answered all related questions prior to discharge.      Findings and plan explained to the Patient. Patient discharged home with instructions regarding supportive care, medications, and reasons to return. The importance of close follow-up was reviewed. The patient was prescribed Doxycyline and Flagyl.      Impression & Plan      Medical Decision Making:  Mitali Alexander is a 41 year old female who presents with pelvic pain.  They look overall well.  A broad differential diagnosis was considered including colitis, appendicitis, intestinal cramping, pyelonephritis, UTI, kidney stone, constipation, diverticulitis, volvulus, ileus, obstruction,  pregnancy (ectopic or intrauterine), ovarian cyst (enlarged or ruptured), ovarian torsion, PID, etc as possibilities.   The workup in the ED shows no definitive etiology for pain; given symptoms would treat for PID. Cultures were already sent and as she does not have insurance declined to repeat.   CT is reassuring  No other etiology for the patients pain is found at this point and my suspicion of an intraabdominal catastrophe or other worrisome etiology is very low.  I will not therefore admit for serial exams and further workup.  Patient is hemodynamically stable in ED.  Return for fevers greater than 102, increasing pain, other new symptoms develop.  Abdominal pain handout given.  Questions were answered.     Diagnosis:    ICD-10-CM   1. Pelvic pain R10.2   2. PID (acute pelvic inflammatory disease) N73.0       Disposition:  Discharged to home with Doxycycline and Flagyl.     Discharge Medications:  New Prescriptions    DOXYCYCLINE (VIBRA-TAB) 100 MG ED STARTER PACK    Take 1 tablet by mouth 2 times daily for 14 days    METRONIDAZOLE (FLAGYL) 500 MG TABLET    Take 1 tablet (500 mg) by mouth 2 times daily for 14 days       IVenkat, am serving as a scribe at 6:29 PM on 3/31/2017 to document services personally performed by Dr. Mcelroy, based on my observations and the provider's statements to me.      [facility] EMERGENCY DEPARTMENT       Ben Mcelroy MD  03/31/17 9801

## 2017-03-31 NOTE — ED AVS SNAPSHOT
[facility] Emergency Department    [address] [facility] [address]    Phone:  [phone]    Fax:  [phone]                                       Mitali Alexander   MRN: [id number]    Department:  [facility] Emergency Department   Date of Visit:  3/31/2017           Patient Information     Date Of Birth          1975        Your diagnoses for this visit were:     Pelvic pain     PID (acute pelvic inflammatory disease)        You were seen by Ben Mcelroy MD.      Follow-up Information     Follow up with Dee Bhatti DO In 6 days.    Specialty:  OB/Gyn    Contact information:    [facility]  [address] [facility] [address]  [phone]        Discharge References/Attachments     PELVIC INFLAMMATORY DISEASE (PID)? WHAT IS (Lao)    PELVIC INFLAMMATORY DISEASE (Lao)    PELVIC PAIN, UNKNOWN CAUSE (Lao)      24 Hour Appointment Hotline       To make an appointment at any [facility], call [phone] ([phone]). If you don't have a family doctor or clinic, we will help you find one. [facility] are conveniently located to serve the needs of you and your family.             Review of your medicines      START taking        Dose / Directions Last dose taken    doxycycline 100 MG ED starter pack   Commonly known as:  VIBRA-TAB   Dose:  1 tablet   Quantity:  28 tablet        Take 1 tablet by mouth 2 times daily for 14 days   Refills:  0        metroNIDAZOLE 500 MG tablet   Commonly known as:  FLAGYL   Dose:  500 mg   Quantity:  28 tablet        Take 1 tablet (500 mg) by mouth 2 times daily for 14 days   Refills:  0          Our records show that you are taking the medicines listed below. If these are incorrect, please call your family doctor or clinic.        Dose / Directions Last dose taken    BENADRYL 25 MG tablet   Generic drug:  diphenhydrAMINE        1 TABLET EVERY 4 TO 6 HOURS AS NEEDED   Refills:  0         benzonatate 200 MG capsule   Commonly known as:  TESSALON   Dose:  200 mg   Quantity:  21 capsule        Take 1 capsule (200 mg) by mouth 3 times daily as needed for cough   Refills:  0        guaiFENesin-dextromethorphan 100-10 MG/5ML syrup   Commonly known as:  ROBITUSSIN DM   Dose:  5 mL   Quantity:  560 mL        Take 5 mLs by mouth 4 times daily as needed for cough   Refills:  0        PRENATAL VITAMIN TABS   OR   Quantity:  30        1 TABLET DAILY   Refills:  0        TYLENOL EXTRA STRENGTH PO        1 TABLET EVERY 4 HOURS AS NEEDED   Refills:  0                Prescriptions were sent or printed at these locations (2 Prescriptions)                   Other Prescriptions                Printed at Department/Unit printer (2 of 2)         doxycycline (VIBRA-TAB) 100 MG ED starter pack               metroNIDAZOLE (FLAGYL) 500 MG tablet                Procedures and tests performed during your visit     Abd/pelvis CT,  IV  contrast only TRAUMA / AAA    CBC with platelets differential    Comprehensive metabolic panel    HCG quantitative pregnancy    Lipase    UA with Microscopic      Orders Needing Specimen Collection     None      Pending Results     No orders found from 3/29/2017 to 4/1/2017.            Pending Culture Results     No orders found from 3/29/2017 to 4/1/2017.             Test Results from your hospital stay     3/31/2017  7:27 PM - Interface, [employer] Results      Component Results     Component Value Ref Range & Units Status    WBC 6.5 4.0 - 11.0 10e9/L Final    RBC Count 4.54 3.8 - 5.2 10e12/L Final    Hemoglobin 12.3 11.7 - 15.7 g/dL Final    Hematocrit 37.2 35.0 - 47.0 % Final    MCV 82 78 - 100 fl Final    MCH 27.1 26.5 - 33.0 pg Final    MCHC 33.1 31.5 - 36.5 g/dL Final    RDW 17.8 (H) 10.0 - 15.0 % Final    Platelet Count 266 150 - 450 10e9/L Final    Diff Method Automated Method  Final    % Neutrophils 73.2 % Final    % Lymphocytes 12.7 % Final    % Monocytes 12.4 % Final    %  Eosinophils 0.6 % Final    % Basophils 0.6 % Final    % Immature Granulocytes 0.5 % Final    Nucleated RBCs 0 0 /100 Final    Absolute Neutrophil 4.7 1.6 - 8.3 10e9/L Final    Absolute Lymphocytes 0.8 0.8 - 5.3 10e9/L Final    Absolute Monocytes 0.8 0.0 - 1.3 10e9/L Final    Absolute Eosinophils 0.0 0.0 - 0.7 10e9/L Final    Absolute Basophils 0.0 0.0 - 0.2 10e9/L Final    Abs Immature Granulocytes 0.0 0 - 0.4 10e9/L Final    Absolute Nucleated RBC 0.0  Final         3/31/2017  7:49 PM - Interface, [employer] Results      Component Results     Component Value Ref Range & Units Status    Sodium 140 133 - 144 mmol/L Final    Potassium 3.6 3.4 - 5.3 mmol/L Final    Chloride 106 94 - 109 mmol/L Final    Carbon Dioxide 28 20 - 32 mmol/L Final    Anion Gap 6 3 - 14 mmol/L Final    Glucose 90 70 - 99 mg/dL Final    Urea Nitrogen 6 (L) 7 - 30 mg/dL Final    Creatinine 0.60 0.52 - 1.04 mg/dL Final    GFR Estimate >90  Non  GFR Calc   >60 mL/min/1.7m2 Final    GFR Estimate If Black >90   GFR Calc   >60 mL/min/1.7m2 Final    Calcium 8.6 8.5 - 10.1 mg/dL Final    Bilirubin Total 0.8 0.2 - 1.3 mg/dL Final    Albumin 3.7 3.4 - 5.0 g/dL Final    Protein Total 6.9 6.8 - 8.8 g/dL Final    Alkaline Phosphatase 53 40 - 150 U/L Final    ALT 17 0 - 50 U/L Final    AST 18 0 - 45 U/L Final         3/31/2017  7:49 PM - Interface, [employer] Results      Component Results     Component Value Ref Range & Units Status    Lipase 145 73 - 393 U/L Final         3/31/2017  8:40 PM - Interface, Radiant Ib      Narrative     CT ABDOMEN PELVIS W CONTRAST 3/31/2017 8:36 PM    HISTORY: Abdominal pain.    TECHNIQUE: CT of the abdomen and pelvis is performed with 81mL  isovue-[id number] intravenously. Radiation dose for this scan was reduced  using automated exposure control, adjustment of the mA and/or kV  according to patient size, or iterative reconstruction technique.    COMPARISON: None.    FINDINGS:    Liver:  Normal.  Gallbladder:Normal.  Pancreas:Normal.  Spleen:Normal.  Adrenals:Normal.  Ascites:None.    Kidneys:Normal.  Bladder:Normal.  Pelvic free fluid:None.    Bowels:Unremarkable.  No evidence of obstruction.  Appendix:Not visualized although there are no findings to suggest  appendicitis.    Abdominal or pelvic lymphadenopathy:None.    Miscellaneous findings:None.        Impression     IMPRESSION:  Unremarkable CT scan of the abdomen and pelvis.    ISRA PEREZ MD         3/31/2017  7:49 PM - Interface, [employer] Results      Component Results     Component Value Ref Range & Units Status    HCG Quantitative Serum <1 0 - 5 IU/L Final         3/31/2017  7:35 PM - Interface, [employer] Results      Component Results     Component Value Ref Range & Units Status    Color Urine Yellow  Final    Appearance Urine Clear  Final    Glucose Urine Negative NEG mg/dL Final    Bilirubin Urine Negative NEG Final    Ketones Urine Negative NEG mg/dL Final    Specific Gravity Urine 1.011 1.003 - 1.035 Final    Blood Urine Negative NEG Final    pH Urine 8.0 (H) 5.0 - 7.0 pH Final    Protein Albumin Urine Negative NEG mg/dL Final    Urobilinogen mg/dL 0.0 0.0 - 2.0 mg/dL Final    Nitrite Urine Negative NEG Final    Leukocyte Esterase Urine Negative NEG Final    Source Midstream Urine  Final    WBC Urine 1 0 - 2 /HPF Final    RBC Urine <1 0 - 2 /HPF Final    Bacteria Urine Few (A) NEG /HPF Final    Squamous Epithelial /HPF Urine 3 (H) 0 - 1 /HPF Final    Mucous Urine Present (A) NEG /LPF Final    Hyaline Casts 1 0 - 2 /LPF Final                Clinical Quality Measure: Blood Pressure Screening     Your blood pressure was checked while you were in the emergency department today. The last reading we obtained was  BP: 106/70 . Please read the guidelines below about what these numbers mean and what you should do about them.  If your systolic blood pressure (the top number) is less than 120 and your diastolic blood pressure (the bottom  "number) is less than 80, then your blood pressure is normal. There is nothing more that you need to do about it.  If your systolic blood pressure (the top number) is 120-139 or your diastolic blood pressure (the bottom number) is 80-89, your blood pressure may be higher than it should be. You should have your blood pressure rechecked within a year by a primary care provider.  If your systolic blood pressure (the top number) is 140 or greater or your diastolic blood pressure (the bottom number) is 90 or greater, you may have high blood pressure. High blood pressure is treatable, but if left untreated over time it can put you at risk for heart attack, stroke, or kidney failure. You should have your blood pressure rechecked by a primary care provider within the next 4 weeks.  If your provider in the emergency department today gave you specific instructions to follow-up with your doctor or provider even sooner than that, you should follow that instruction and not wait for up to 4 weeks for your follow-up visit.        Thank you for choosing [facility]       Thank you for choosing [facility] for your care. Our goal is always to provide you with excellent care. Hearing back from our patients is one way we can continue to improve our services. Please take a few minutes to complete the written survey that you may receive in the mail after you visit with us. Thank you!        [employer] Information     [employer] lets you send messages to your doctor, view your test results, renew your prescriptions, schedule appointments and more. To sign up, go to [email or web address] . Click on \"Log in\" on the left side of the screen, which will take you to the Welcome page. Then click on \"Sign up Now\" on the right side of the page.     You will be asked to enter the access code listed below, as well as some personal information. Please follow the directions to create your username and password.     Your access code is: [email or web address]  Expires: " 2017  4:59 PM     Your access code will  in 90 days. If you need help or a new code, please call your [facility] clinic or [phone].        Care EveryWhere ID     This is your Care EveryWhere ID. This could be used by other organizations to access your [facility] medical records  [id number]        After Visit Summary       This is your record. Keep this with you and show to your community pharmacist(s) and doctor(s) at your next visit.

## 2023-12-24 ENCOUNTER — APPOINTMENT (OUTPATIENT)
Dept: GENERAL RADIOLOGY | Facility: CLINIC | Age: 48
DRG: 871 | End: 2023-12-24
Attending: STUDENT IN AN ORGANIZED HEALTH CARE EDUCATION/TRAINING PROGRAM

## 2023-12-24 ENCOUNTER — APPOINTMENT (OUTPATIENT)
Dept: ULTRASOUND IMAGING | Facility: CLINIC | Age: 48
DRG: 871 | End: 2023-12-24
Attending: STUDENT IN AN ORGANIZED HEALTH CARE EDUCATION/TRAINING PROGRAM

## 2023-12-24 ENCOUNTER — HOSPITAL ENCOUNTER (INPATIENT)
Facility: CLINIC | Age: 48
LOS: 3 days | Discharge: HOME | DRG: 871 | End: 2023-12-27
Attending: STUDENT IN AN ORGANIZED HEALTH CARE EDUCATION/TRAINING PROGRAM | Admitting: OBSTETRICS & GYNECOLOGY

## 2023-12-24 DIAGNOSIS — R57.1 HYPOVOLEMIC SHOCK (H): ICD-10-CM

## 2023-12-24 DIAGNOSIS — N93.9 ABNORMAL VAGINAL BLEEDING: ICD-10-CM

## 2023-12-24 DIAGNOSIS — R78.81 BACTEREMIA: Primary | ICD-10-CM

## 2023-12-24 DIAGNOSIS — U07.1 COVID-19: ICD-10-CM

## 2023-12-24 DIAGNOSIS — R55 SYNCOPE: ICD-10-CM

## 2023-12-24 DIAGNOSIS — D62 ANEMIA DUE TO BLOOD LOSS, ACUTE: ICD-10-CM

## 2023-12-24 DIAGNOSIS — D25.9 UTERINE FIBROID: ICD-10-CM

## 2023-12-24 LAB
ABO/RH(D): NORMAL
ALBUMIN UR-MCNC: 30 MG/DL
ANION GAP SERPL CALCULATED.3IONS-SCNC: 10 MMOL/L (ref 7–15)
ANTIBODY SCREEN: NEGATIVE
APPEARANCE UR: CLEAR
BACTERIA #/AREA URNS HPF: ABNORMAL /HPF
BASE EXCESS BLDV CALC-SCNC: -0.7 MMOL/L (ref -7.7–1.9)
BASOPHILS # BLD AUTO: 0 10E3/UL (ref 0–0.2)
BASOPHILS NFR BLD AUTO: 0 %
BILIRUB UR QL STRIP: NEGATIVE
BLD PROD TYP BPU: NORMAL
BLOOD COMPONENT TYPE: NORMAL
BUN SERPL-MCNC: 12.6 MG/DL (ref 6–20)
CALCIUM SERPL-MCNC: 7.9 MG/DL (ref 8.6–10)
CHLORIDE SERPL-SCNC: 102 MMOL/L (ref 98–107)
CODING SYSTEM: NORMAL
COLOR UR AUTO: YELLOW
CREAT SERPL-MCNC: 0.8 MG/DL (ref 0.51–0.95)
CROSSMATCH: NORMAL
DEPRECATED HCO3 PLAS-SCNC: 23 MMOL/L (ref 22–29)
EGFRCR SERPLBLD CKD-EPI 2021: 89 ML/MIN/1.73M2
EOSINOPHIL # BLD AUTO: 0 10E3/UL (ref 0–0.7)
EOSINOPHIL NFR BLD AUTO: 0 %
ERYTHROCYTE [DISTWIDTH] IN BLOOD BY AUTOMATED COUNT: 21.8 % (ref 10–15)
FLUAV RNA SPEC QL NAA+PROBE: NEGATIVE
FLUBV RNA RESP QL NAA+PROBE: NEGATIVE
GLUCOSE SERPL-MCNC: 107 MG/DL (ref 70–99)
GLUCOSE UR STRIP-MCNC: NEGATIVE MG/DL
HCO3 BLDV-SCNC: 24 MMOL/L (ref 21–28)
HCT VFR BLD AUTO: 18.4 % (ref 35–47)
HGB BLD-MCNC: 4.8 G/DL (ref 11.7–15.7)
HGB UR QL STRIP: ABNORMAL
HOLD SPECIMEN: NORMAL
IMM GRANULOCYTES # BLD: 0.1 10E3/UL
IMM GRANULOCYTES NFR BLD: 1 %
ISSUE DATE AND TIME: NORMAL
KETONES UR STRIP-MCNC: NEGATIVE MG/DL
LACTATE SERPL-SCNC: 0.9 MMOL/L (ref 0.7–2)
LEUKOCYTE ESTERASE UR QL STRIP: ABNORMAL
LYMPHOCYTES # BLD AUTO: 0.8 10E3/UL (ref 0.8–5.3)
LYMPHOCYTES NFR BLD AUTO: 5 %
MCH RBC QN AUTO: 16.7 PG (ref 26.5–33)
MCHC RBC AUTO-ENTMCNC: 26.1 G/DL (ref 31.5–36.5)
MCV RBC AUTO: 64 FL (ref 78–100)
MONOCYTES # BLD AUTO: 1.2 10E3/UL (ref 0–1.3)
MONOCYTES NFR BLD AUTO: 8 %
NEUTROPHILS # BLD AUTO: 12.8 10E3/UL (ref 1.6–8.3)
NEUTROPHILS NFR BLD AUTO: 86 %
NITRATE UR QL: NEGATIVE
NRBC # BLD AUTO: 0 10E3/UL
NRBC BLD AUTO-RTO: 0 /100
O2/TOTAL GAS SETTING VFR VENT: 21 %
PCO2 BLDV: 41 MM HG (ref 40–50)
PH BLDV: 7.38 [PH] (ref 7.32–7.43)
PH UR STRIP: 5.5 [PH] (ref 5–7)
PLATELET # BLD AUTO: 215 10E3/UL (ref 150–450)
PO2 BLDV: 41 MM HG (ref 25–47)
POTASSIUM SERPL-SCNC: 3.8 MMOL/L (ref 3.4–5.3)
PROCALCITONIN SERPL IA-MCNC: 0.46 NG/ML
RBC # BLD AUTO: 2.88 10E6/UL (ref 3.8–5.2)
RBC URINE: 30 /HPF
RSV RNA SPEC NAA+PROBE: NEGATIVE
SARS-COV-2 RNA RESP QL NAA+PROBE: POSITIVE
SODIUM SERPL-SCNC: 135 MMOL/L (ref 135–145)
SP GR UR STRIP: 1.01 (ref 1–1.03)
SPECIMEN EXPIRATION DATE: NORMAL
TROPONIN T SERPL HS-MCNC: 10 NG/L
UNIT ABO/RH: NORMAL
UNIT NUMBER: NORMAL
UNIT STATUS: NORMAL
UNIT TYPE ISBT: 7300
UROBILINOGEN UR STRIP-MCNC: 2 MG/DL
WBC # BLD AUTO: 14.9 10E3/UL (ref 4–11)
WBC URINE: 54 /HPF

## 2023-12-24 PROCEDURE — 250N000011 HC RX IP 250 OP 636: Performed by: EMERGENCY MEDICINE

## 2023-12-24 PROCEDURE — 76856 US EXAM PELVIC COMPLETE: CPT

## 2023-12-24 PROCEDURE — 96374 THER/PROPH/DIAG INJ IV PUSH: CPT

## 2023-12-24 PROCEDURE — 87637 SARSCOV2&INF A&B&RSV AMP PRB: CPT | Performed by: STUDENT IN AN ORGANIZED HEALTH CARE EDUCATION/TRAINING PROGRAM

## 2023-12-24 PROCEDURE — 250N000013 HC RX MED GY IP 250 OP 250 PS 637: Performed by: OBSTETRICS & GYNECOLOGY

## 2023-12-24 PROCEDURE — 93005 ELECTROCARDIOGRAM TRACING: CPT

## 2023-12-24 PROCEDURE — 85025 COMPLETE CBC W/AUTO DIFF WBC: CPT | Performed by: STUDENT IN AN ORGANIZED HEALTH CARE EDUCATION/TRAINING PROGRAM

## 2023-12-24 PROCEDURE — 87149 DNA/RNA DIRECT PROBE: CPT | Performed by: STUDENT IN AN ORGANIZED HEALTH CARE EDUCATION/TRAINING PROGRAM

## 2023-12-24 PROCEDURE — 250N000009 HC RX 250: Performed by: STUDENT IN AN ORGANIZED HEALTH CARE EDUCATION/TRAINING PROGRAM

## 2023-12-24 PROCEDURE — 80048 BASIC METABOLIC PNL TOTAL CA: CPT | Performed by: STUDENT IN AN ORGANIZED HEALTH CARE EDUCATION/TRAINING PROGRAM

## 2023-12-24 PROCEDURE — 82803 BLOOD GASES ANY COMBINATION: CPT | Performed by: STUDENT IN AN ORGANIZED HEALTH CARE EDUCATION/TRAINING PROGRAM

## 2023-12-24 PROCEDURE — 87186 SC STD MICRODIL/AGAR DIL: CPT | Performed by: STUDENT IN AN ORGANIZED HEALTH CARE EDUCATION/TRAINING PROGRAM

## 2023-12-24 PROCEDURE — 258N000003 HC RX IP 258 OP 636: Performed by: EMERGENCY MEDICINE

## 2023-12-24 PROCEDURE — 83529 ASAY OF INTERLEUKIN-6 (IL-6): CPT | Performed by: NURSE PRACTITIONER

## 2023-12-24 PROCEDURE — 36415 COLL VENOUS BLD VENIPUNCTURE: CPT | Performed by: STUDENT IN AN ORGANIZED HEALTH CARE EDUCATION/TRAINING PROGRAM

## 2023-12-24 PROCEDURE — 99285 EMERGENCY DEPT VISIT HI MDM: CPT | Mod: 25

## 2023-12-24 PROCEDURE — 250N000013 HC RX MED GY IP 250 OP 250 PS 637: Performed by: STUDENT IN AN ORGANIZED HEALTH CARE EDUCATION/TRAINING PROGRAM

## 2023-12-24 PROCEDURE — 84484 ASSAY OF TROPONIN QUANT: CPT | Performed by: STUDENT IN AN ORGANIZED HEALTH CARE EDUCATION/TRAINING PROGRAM

## 2023-12-24 PROCEDURE — 258N000003 HC RX IP 258 OP 636: Performed by: STUDENT IN AN ORGANIZED HEALTH CARE EDUCATION/TRAINING PROGRAM

## 2023-12-24 PROCEDURE — P9016 RBC LEUKOCYTES REDUCED: HCPCS | Performed by: STUDENT IN AN ORGANIZED HEALTH CARE EDUCATION/TRAINING PROGRAM

## 2023-12-24 PROCEDURE — 76830 TRANSVAGINAL US NON-OB: CPT

## 2023-12-24 PROCEDURE — 36415 COLL VENOUS BLD VENIPUNCTURE: CPT | Performed by: NURSE PRACTITIONER

## 2023-12-24 PROCEDURE — 83605 ASSAY OF LACTIC ACID: CPT | Performed by: STUDENT IN AN ORGANIZED HEALTH CARE EDUCATION/TRAINING PROGRAM

## 2023-12-24 PROCEDURE — 96361 HYDRATE IV INFUSION ADD-ON: CPT

## 2023-12-24 PROCEDURE — 87086 URINE CULTURE/COLONY COUNT: CPT | Performed by: STUDENT IN AN ORGANIZED HEALTH CARE EDUCATION/TRAINING PROGRAM

## 2023-12-24 PROCEDURE — 86923 COMPATIBILITY TEST ELECTRIC: CPT | Performed by: STUDENT IN AN ORGANIZED HEALTH CARE EDUCATION/TRAINING PROGRAM

## 2023-12-24 PROCEDURE — 120N000001 HC R&B MED SURG/OB

## 2023-12-24 PROCEDURE — 96375 TX/PRO/DX INJ NEW DRUG ADDON: CPT

## 2023-12-24 PROCEDURE — 71046 X-RAY EXAM CHEST 2 VIEWS: CPT

## 2023-12-24 PROCEDURE — 84145 PROCALCITONIN (PCT): CPT | Performed by: NURSE PRACTITIONER

## 2023-12-24 PROCEDURE — 250N000011 HC RX IP 250 OP 636: Performed by: NURSE PRACTITIONER

## 2023-12-24 PROCEDURE — 81001 URINALYSIS AUTO W/SCOPE: CPT | Performed by: STUDENT IN AN ORGANIZED HEALTH CARE EDUCATION/TRAINING PROGRAM

## 2023-12-24 PROCEDURE — 86900 BLOOD TYPING SEROLOGIC ABO: CPT | Performed by: STUDENT IN AN ORGANIZED HEALTH CARE EDUCATION/TRAINING PROGRAM

## 2023-12-24 RX ORDER — DOCUSATE SODIUM 100 MG/1
CAPSULE, LIQUID FILLED ORAL
Status: COMPLETED
Start: 2023-12-24 | End: 2023-12-25

## 2023-12-24 RX ORDER — ACETAMINOPHEN 500 MG
1000 TABLET ORAL EVERY 8 HOURS PRN
Status: DISCONTINUED | OUTPATIENT
Start: 2023-12-25 | End: 2023-12-27 | Stop reason: HOSPADM

## 2023-12-24 RX ORDER — PROCHLORPERAZINE 25 MG
25 SUPPOSITORY, RECTAL RECTAL EVERY 12 HOURS PRN
Status: DISCONTINUED | OUTPATIENT
Start: 2023-12-24 | End: 2023-12-27 | Stop reason: HOSPADM

## 2023-12-24 RX ORDER — LEVOTHYROXINE SODIUM 75 UG/1
75 TABLET ORAL DAILY
COMMUNITY

## 2023-12-24 RX ORDER — ACETAMINOPHEN 325 MG/1
650 TABLET ORAL EVERY 4 HOURS PRN
Status: DISCONTINUED | OUTPATIENT
Start: 2023-12-24 | End: 2023-12-24

## 2023-12-24 RX ORDER — PROCHLORPERAZINE MALEATE 5 MG
10 TABLET ORAL EVERY 6 HOURS PRN
Status: DISCONTINUED | OUTPATIENT
Start: 2023-12-24 | End: 2023-12-27 | Stop reason: HOSPADM

## 2023-12-24 RX ORDER — TRANEXAMIC ACID 10 MG/ML
1 INJECTION, SOLUTION INTRAVENOUS ONCE
Status: COMPLETED | OUTPATIENT
Start: 2023-12-24 | End: 2023-12-24

## 2023-12-24 RX ORDER — DEXAMETHASONE SODIUM PHOSPHATE 10 MG/ML
6 INJECTION, SOLUTION INTRAMUSCULAR; INTRAVENOUS EVERY 24 HOURS
Status: DISCONTINUED | OUTPATIENT
Start: 2023-12-24 | End: 2023-12-26

## 2023-12-24 RX ORDER — BENZONATATE 100 MG/1
100 CAPSULE ORAL ONCE
Status: COMPLETED | OUTPATIENT
Start: 2023-12-24 | End: 2023-12-24

## 2023-12-24 RX ORDER — AMOXICILLIN 250 MG
1 CAPSULE ORAL 2 TIMES DAILY PRN
Status: DISCONTINUED | OUTPATIENT
Start: 2023-12-24 | End: 2023-12-27 | Stop reason: HOSPADM

## 2023-12-24 RX ORDER — ACETAMINOPHEN 650 MG/1
650 SUPPOSITORY RECTAL EVERY 4 HOURS PRN
Status: DISCONTINUED | OUTPATIENT
Start: 2023-12-24 | End: 2023-12-24

## 2023-12-24 RX ORDER — CEFTRIAXONE 1 G/1
1 INJECTION, POWDER, FOR SOLUTION INTRAMUSCULAR; INTRAVENOUS EVERY 24 HOURS
Status: DISCONTINUED | OUTPATIENT
Start: 2023-12-24 | End: 2023-12-25 | Stop reason: DRUGHIGH

## 2023-12-24 RX ORDER — LEVOTHYROXINE SODIUM 75 UG/1
75 TABLET ORAL
Status: DISCONTINUED | OUTPATIENT
Start: 2023-12-25 | End: 2023-12-25

## 2023-12-24 RX ORDER — AMOXICILLIN 250 MG
2 CAPSULE ORAL 2 TIMES DAILY PRN
Status: DISCONTINUED | OUTPATIENT
Start: 2023-12-24 | End: 2023-12-27 | Stop reason: HOSPADM

## 2023-12-24 RX ORDER — ONDANSETRON 4 MG/1
4 TABLET, ORALLY DISINTEGRATING ORAL EVERY 6 HOURS PRN
Status: DISCONTINUED | OUTPATIENT
Start: 2023-12-24 | End: 2023-12-27 | Stop reason: HOSPADM

## 2023-12-24 RX ORDER — IBUPROFEN 600 MG/1
600 TABLET, FILM COATED ORAL EVERY 6 HOURS PRN
Status: DISCONTINUED | OUTPATIENT
Start: 2023-12-24 | End: 2023-12-27 | Stop reason: HOSPADM

## 2023-12-24 RX ORDER — ONDANSETRON 2 MG/ML
4 INJECTION INTRAMUSCULAR; INTRAVENOUS EVERY 6 HOURS PRN
Status: DISCONTINUED | OUTPATIENT
Start: 2023-12-24 | End: 2023-12-27 | Stop reason: HOSPADM

## 2023-12-24 RX ORDER — SALIVA STIMULANT COMB. NO.3
1 SPRAY, NON-AEROSOL (ML) MUCOUS MEMBRANE 4 TIMES DAILY
Status: DISCONTINUED | OUTPATIENT
Start: 2023-12-24 | End: 2023-12-27 | Stop reason: HOSPADM

## 2023-12-24 RX ORDER — IPRATROPIUM BROMIDE AND ALBUTEROL SULFATE 2.5; .5 MG/3ML; MG/3ML
3 SOLUTION RESPIRATORY (INHALATION) EVERY 4 HOURS PRN
Status: DISCONTINUED | OUTPATIENT
Start: 2023-12-24 | End: 2023-12-27 | Stop reason: HOSPADM

## 2023-12-24 RX ORDER — ACETAMINOPHEN 500 MG
1000 TABLET ORAL EVERY 4 HOURS PRN
Status: DISCONTINUED | OUTPATIENT
Start: 2023-12-24 | End: 2023-12-24

## 2023-12-24 RX ORDER — TRANEXAMIC ACID 650 MG/1
1300 TABLET ORAL 2 TIMES DAILY
Status: DISCONTINUED | OUTPATIENT
Start: 2023-12-25 | End: 2023-12-27 | Stop reason: HOSPADM

## 2023-12-24 RX ORDER — MEGESTROL ACETATE 20 MG/1
20 TABLET ORAL DAILY
Status: DISCONTINUED | OUTPATIENT
Start: 2023-12-24 | End: 2023-12-27 | Stop reason: HOSPADM

## 2023-12-24 RX ORDER — LIDOCAINE 40 MG/G
CREAM TOPICAL
Status: DISCONTINUED | OUTPATIENT
Start: 2023-12-24 | End: 2023-12-27 | Stop reason: HOSPADM

## 2023-12-24 RX ORDER — DOCUSATE SODIUM 100 MG/1
100 CAPSULE, LIQUID FILLED ORAL 2 TIMES DAILY
Status: DISCONTINUED | OUTPATIENT
Start: 2023-12-24 | End: 2023-12-27 | Stop reason: HOSPADM

## 2023-12-24 RX ORDER — GUAIFENESIN/DEXTROMETHORPHAN 100-10MG/5
10 SYRUP ORAL EVERY 4 HOURS PRN
Status: DISCONTINUED | OUTPATIENT
Start: 2023-12-24 | End: 2023-12-27 | Stop reason: HOSPADM

## 2023-12-24 RX ORDER — ONDANSETRON 2 MG/ML
INJECTION INTRAMUSCULAR; INTRAVENOUS
Status: DISPENSED
Start: 2023-12-24 | End: 2023-12-25

## 2023-12-24 RX ORDER — CEFTRIAXONE SODIUM 1 G
VIAL (EA) INJECTION
Status: COMPLETED
Start: 2023-12-24 | End: 2023-12-25

## 2023-12-24 RX ORDER — CALCIUM CARBONATE 500 MG/1
1000 TABLET, CHEWABLE ORAL 4 TIMES DAILY PRN
Status: DISCONTINUED | OUTPATIENT
Start: 2023-12-24 | End: 2023-12-27 | Stop reason: HOSPADM

## 2023-12-24 RX ORDER — ONDANSETRON 2 MG/ML
4 INJECTION INTRAMUSCULAR; INTRAVENOUS EVERY 30 MIN PRN
Status: DISCONTINUED | OUTPATIENT
Start: 2023-12-24 | End: 2023-12-25

## 2023-12-24 RX ADMIN — CEFTRIAXONE 1 G: 1 INJECTION, POWDER, FOR SOLUTION INTRAMUSCULAR; INTRAVENOUS at 22:07

## 2023-12-24 RX ADMIN — SODIUM CHLORIDE 1000 ML: 9 INJECTION, SOLUTION INTRAVENOUS at 14:30

## 2023-12-24 RX ADMIN — TRANEXAMIC ACID 1 G: 10 INJECTION, SOLUTION INTRAVENOUS at 15:28

## 2023-12-24 RX ADMIN — SODIUM CHLORIDE 25 MG: 9 INJECTION, SOLUTION INTRAVENOUS at 16:28

## 2023-12-24 RX ADMIN — MEGESTROL ACETATE 20 MG: 20 TABLET ORAL at 21:35

## 2023-12-24 RX ADMIN — BENZONATATE 100 MG: 100 CAPSULE ORAL at 15:45

## 2023-12-24 RX ADMIN — Medication 1 SPRAY: at 21:36

## 2023-12-24 RX ADMIN — ONDANSETRON 4 MG: 2 INJECTION INTRAMUSCULAR; INTRAVENOUS at 17:24

## 2023-12-24 RX ADMIN — ACETAMINOPHEN 1000 MG: 500 TABLET, FILM COATED ORAL at 17:24

## 2023-12-24 RX ADMIN — DEXAMETHASONE SODIUM PHOSPHATE 6 MG: 10 INJECTION, SOLUTION INTRAMUSCULAR; INTRAVENOUS at 21:36

## 2023-12-24 ASSESSMENT — ACTIVITIES OF DAILY LIVING (ADL)
ADLS_ACUITY_SCORE: 37
ADLS_ACUITY_SCORE: 35
ADLS_ACUITY_SCORE: 35
ADLS_ACUITY_SCORE: 37
ADLS_ACUITY_SCORE: 35

## 2023-12-24 NOTE — PHARMACY-ADMISSION MEDICATION HISTORY
Pharmacist Admission Medication History    Admission medication history is complete. The information provided in this note is only as accurate as the sources available at the time of the update.    Information Source(s): Family member and Patient's pharmacy via in-person and phone    Pertinent Information: none    Changes made to PTA medication list:  Added: Nyquil, Levothyroxine 75mcg   Deleted: Benadryl, Benzonatate 200mg, Robitussin, Prenatal   Changed: None    Medication Affordability:  Not including over the counter (OTC) medications, was there a time in the past 3 months when you did not take your medications as prescribed because of cost?: No    Allergies reviewed with patient and updates made in EHR: yes    Medication History Completed By: Murtaza Velásquez RPH 12/24/2023 3:11 PM    Prior to Admission medications    Medication Sig Last Dose Taking? Auth Provider Long Term End Date   acetaminophen (TYLENOL) 500 MG tablet Take 1 tablet by mouth as needed 12/23/2023 Yes Reported, Patient     DM-Doxylamine-Acetaminophen (NYQUIL COLD & FLU PO) Take by mouth as needed 12/23/2023 Yes Unknown, Entered By History     levothyroxine (SYNTHROID/LEVOTHROID) 75 MCG tablet Take 75 mcg by mouth daily 12/23/2023 Yes Unknown, Entered By History Yes

## 2023-12-24 NOTE — ED NOTES
S[facility]Skin WDL: all (PT COMES IN TODAY WITH INCREASING SOB, COUGH, WEAKNESS, FEVER, CHILLS, + SYNCOPAL EVENT AND SKIN COLOR IS PALE.)Skin Color: pale

## 2023-12-24 NOTE — LETTER
[facility]  [address]  [facility] [address]  Phone: [phone]  Fax: [phone]    December 27, 2023        Mitali Alexander  [address] [facility] [address]          To whom it may concern:    RE: Mitali Alexander    This patient was hospitalized from 12/24/2023-12/27/2023 at [facility] for acute medical illness.  She is excused from work on these dates and can return to work in 1 week if she continues to show clinical improvement.    Please contact me for questions or concerns.      Sincerely,    Ben Sims MD

## 2023-12-24 NOTE — ED PROVIDER NOTES
Emergency Department Attending Supervision Note  12/24/2023  2:34 PM      I evaluated this patient in conjunction with Amanda CARBAJAL      Briefly, the patient presented with syncope in the bathroom after 4 days of consistent VB.  Filling diapers.  No abd pain.  Vomiting.  No obvious trauma.        On my exam, ill appearing pallor.  Tachy,  tacypneic.  No abdominal pain.  Head to toe trauma normal.    Results:  Covid + with severe anemia to 4. EKG no injury pattern    ED course:  Conjugated estrogen txa blood transfusion.  Last H&h years ago but has dropped 8 points since.  Will need admission.      My impression is abnormal uterine bleeding.  Hypovolemic vasovagal  Anemia.  Covid +.  EKG and trop neg.  Lactate reassuring.  CXR no acute abnl.  US uterine fibroids.     Admitted to obgyn.        Diagnosis    ICD-10-CM    1. Anemia due to blood loss, acute  D62       2. Abnormal vaginal bleeding  N93.9       3. COVID-19  U07.1       4. Syncope  R55       5. Hypovolemic shock (H)  R57.1       6. Uterine fibroid  D25.9             Amanda Deleon, Tyler Cervantes MD  12/24/23 8230

## 2023-12-24 NOTE — ED PROVIDER NOTES
History     Chief Complaint:  Cough and Syncope       HPI   Mitali Alexander is a 48 year old female who presents with syncopal episode.  Patient states that she was using the restroom, urinating, when she was changing her pad and had a syncopal episode.  Denies hitting her head.  Daughter reports that this is not before.  Patient had a viral illness about 1 week ago but symptoms improved.  Patient and family went on a trip to [place] for 3 days and now since returning, patient has been feeling unwell.  Patient has had low appetite and episodes of emesis for the past 2 days.  Also worsening cough that is dry in nature.  Patient endorses body aches and headache but no known fevers.  Bowel movements have been normal.  She denies any urinary symptoms.  Denies chest pain but does feel short of breath.  Daughter states that she appears very pale today.  Patient states that she is currently on her period and experiencing heavy flow with blood clots.  States that she typically gets her menstrual cycle every 6 months now and they are always this heavy.  She denies any significant abdominal discomfort.      Independent Historian:   None - Patient Only through use of her daughter as     Review of External Notes:   None       Medications:    acetaminophen (TYLENOL) 500 MG tablet  DM-Doxylamine-Acetaminophen (NYQUIL COLD & FLU PO)  levothyroxine (SYNTHROID/LEVOTHROID) 75 MCG tablet        Past Medical History:    Past Medical History:   Diagnosis Date    Nonspecific abnormal results of other endocrine function study        Past Surgical History:    Past Surgical History:   Procedure Laterality Date    [facility] NONSPECIFIC PROCEDURE      Right ovarian cyst removed        Physical Exam   Patient Vitals for the past 24 hrs:   BP Temp Temp src Pulse Resp SpO2   12/24/23 1625 112/59 -- -- 105 10 100 %   12/24/23 1439 -- -- -- 91 18 100 %   12/24/23 1429 112/55 -- -- 101 29 98 %   12/24/23 1419 105/64 -- -- 93 14 99 %   12/24/23  1354 98/65 -- -- 89 16 99 %   12/24/23 1339 -- -- -- 96 19 98 %   12/24/23 1324 103/66 -- -- -- -- --   12/24/23 1318 101/63 99.2  F (37.3  C) Oral 108 20 100 %        Physical Exam  General: Appears pale and diaphoretic. Alert. Cooperative with exam  Head:  Scalp is NC/AT  Eyes:  No scleral icterus, PERRL  ENT:  The external nose and ears are normal.  Neck:  Normal range of motion without rigidity.  CV:  Regular rate and rhythm    No pathologic murmur   Resp:  Breath sounds are clear bilaterally    Non-labored, no retractions or accessory muscle use  GI:  Abdomen is soft, no distension, no tenderness. No peritoneal signs  MS:  No lower extremity edema   Skin:  Warm and dry, No rash or lesions noted.  Neuro:  Oriented x 3. No gross motor deficits.      Emergency Department Course     ECG results from 12/24/23   EKG 12-lead, tracing only     Value    Systolic Blood Pressure     Diastolic Blood Pressure     Ventricular Rate 97    Atrial Rate 97    VT Interval 118    QRS Duration 84        QTc 449    P Axis 21    R AXIS 44    T Axis 28    Interpretation ECG      Sinus rhythm  Normal ECG  No previous ECGs available         Imaging:  US Pelvic Complete with Transvaginal   Final Result   IMPRESSION:   1.  Mild endometrial thickening, correlate with phase of patient's menstrual cycle, consider short-term interval follow-up.   2.  Uterine fibroids.   3.  Normal left ovary. Right ovary obscured by bowel gas.               XR Chest 2 Views   Final Result   IMPRESSION: No pneumothorax or pleural effusion. No focal consolidation. Cardiomediastinal silhouette within normal limits. No acute osseous abnormality.          Laboratory:  Labs Ordered and Resulted from Time of ED Arrival to Time of ED Departure   INFLUENZA A/B, RSV, & SARS-COV2 PCR - Abnormal       Result Value    Influenza A PCR Negative      Influenza B PCR Negative      RSV PCR Negative      SARS CoV2 PCR Positive (*)    BASIC METABOLIC PANEL - Abnormal     Sodium 135      Potassium 3.8      Chloride 102      Carbon Dioxide (CO2) 23      Anion Gap 10      Urea Nitrogen 12.6      Creatinine 0.80      GFR Estimate 89      Calcium 7.9 (*)     Glucose 107 (*)    CBC WITH PLATELETS AND DIFFERENTIAL - Abnormal    WBC Count 14.9 (*)     RBC Count 2.88 (*)     Hemoglobin 4.8 (*)     Hematocrit 18.4 (*)     MCV 64 (*)     MCH 16.7 (*)     MCHC 26.1 (*)     RDW 21.8 (*)     Platelet Count 215      % Neutrophils 86      % Lymphocytes 5      % Monocytes 8      % Eosinophils 0      % Basophils 0      % Immature Granulocytes 1      NRBCs per 100 WBC 0      Absolute Neutrophils 12.8 (*)     Absolute Lymphocytes 0.8      Absolute Monocytes 1.2      Absolute Eosinophils 0.0      Absolute Basophils 0.0      Absolute Immature Granulocytes 0.1      Absolute NRBCs 0.0     LACTIC ACID WHOLE BLOOD - Normal    Lactic Acid 0.9     TROPONIN T, HIGH SENSITIVITY - Normal    Troponin T, High Sensitivity 10     BLOOD GAS VENOUS    pH Venous 7.38      pCO2 Venous 41      pO2 Venous 41      Bicarbonate Venous 24      Base Excess/Deficit -0.7      FIO2 21     ROUTINE UA WITH MICROSCOPIC REFLEX TO CULTURE   TYPE AND SCREEN, ADULT    ABO/RH(D) B POS      Antibody Screen Negative      SPECIMEN EXPIRATION DATE [id number]     PREPARE RED BLOOD CELLS (UNIT)    Blood Component Type Red Blood Cells      Product Code O8497N89      Unit Status Ready for issue      Unit Number [id number]      CROSSMATCH Compatible      CODING SYSTEM [id number]     PREPARE RED BLOOD CELLS (UNIT)    Blood Component Type Red Blood Cells      Product Code N4141O30      Unit Status Ready for issue      Unit Number [id number]      CROSSMATCH Compatible      CODING SYSTEM GSQX713     PREPARE RED BLOOD CELLS (UNIT)    Blood Component Type Red Blood Cells      Product Code I1392P85      Unit Status Ready for issue      Unit Number [id number]      CROSSMATCH Compatible      CODING SYSTEM [id number]     PREPARE RED BLOOD CELLS  (UNIT)   BLOOD CULTURE   BLOOD CULTURE   TRANSFUSE RED BLOOD CELLS (UNIT)   ABO/RH TYPE AND SCREEN        Procedures   None    Emergency Department Course & Assessments:    Interventions:  Medications   sodium chloride 0.9% BOLUS 1-250 mL (has no administration in time range)   sodium chloride 0.9% BOLUS 1,000 mL (1,000 mLs Intravenous $New Bag 12/24/23 1430)   tranexamic acid 1 g in 100 mL NS IV bag (premix) (1 g Intravenous $Given 12/24/23 1528)   conjugated estrogens (PREMARIN) 25 mg in sodium chloride 0.9 % 50 mL intermittent infusion (25 mg Intravenous $Given 12/24/23 1628)   benzonatate (TESSALON) capsule 100 mg (100 mg Oral $Given 12/24/23 1545)        Independent Interpretation (X-rays, CTs, rhythm strip):  CXR - no opacities, effusions, widened mediastinum    Consultations/Discussion of Management or Tests:   ED Course as of 12/24/23 1649   Sun Dec 24, 2023   1643 Consulted with Dr. Chahal with OB/GYN       Social Determinants of Health affecting care:   None    Disposition:  The patient was admitted to the hospital under the care of Dr. Chahal.     Impression & Plan      Medical Decision Making:  Mitali Alexander is a 48 year old female who presents with cough, fatigue, syncopal episode, and significant vaginal bleeding.  Vaginal bleeding has been ongoing for 4 days and patient states that it has been very severe with numerous clots passing.  States that this is normal for her every 6 months when she gets her period.  On exam she appears very pale and fatigued.  Blood pressure soft and mildly tachycardic.  EKG without ischemic changes.  Start patient IV fluids labs are being drawn and hemoglobin returned with critical value of 4.8.  Suspect this is due to the ongoing severe vaginal bleeding.  Consented patient to blood products and ordered 3 units to be provided.  COVID/flu/RSV testing was obtained given her ongoing cough and mildly elevated temperature of 99.2, COVID did return positive.  Chest x-ray was  obtained due to concern for possible concomitant pneumonia however this is thankfully negative.  Outside of severe anemia on labs and leukocytosis, labs otherwise unremarkable.  Patient has not seen a medical provider for quite some time and has not been seen for her abnormal vaginal bleeding.  Transvaginal/pelvic ultrasound completed and shows multiple fibroids present, endometrium measuring 19 mm in thickness.  TXA and conjugated estrogen provided for ongoing vaginal bleeding.  Consulted with Dr. Chahal with OB/GYN service who agrees with patient's need for admission and accepts her for further management and stabilization.    Diagnosis:    ICD-10-CM    1. Anemia due to blood loss, acute  D62       2. Abnormal vaginal bleeding  N93.9       3. COVID-19  U07.1       4. Syncope  R55       5. Hypovolemic shock (H)  R57.1       6. Uterine fibroid  D25.9            Discharge Medications:  New Prescriptions    No medications on file          12/24/2023   Amanda Deleon PA-C Snell, Lauren R, PA-C  12/24/23 [id number]

## 2023-12-24 NOTE — ED TRIAGE NOTES
Pt had syncope on toilet. Did not hit head. C/o cough, subjective fever and chills x1 week. ABC intact.

## 2023-12-24 NOTE — ED NOTES
[facility]  ED Nurse Handoff Report    ED Chief complaint: Cough and Syncope  . ED Diagnosis:   Final diagnoses:   None     Allergies: No Known Allergies    Code Status: Full Code    Activity level - Baseline/Home:  independent.  Activity Level - Current:   standby.   Lift room needed: No.   Bariatric: No   Needed: Yes   Isolation: Yes.   Infection: COVID+.     Respiratory status: Room air    Vital Signs (within 30 minutes):   Vitals:    12/24/23 1354 12/24/23 1419 12/24/23 1429 12/24/23 1439   BP: 98/65 105/64 112/55    Pulse: 89 93 101 91   Resp: 16 14 29 18   Temp:       TempSrc:       SpO2: 99% 99% 98% 100%     Cardiac Rhythm:  ,      Sinus rhythm   Normal ECG   Pain level:    Patient confused: No.   Patient Falls Risk: patient and family education.   Elimination Status: Has voided     Focused Assessment:  SkinSkin WDL: all (PT COMES IN TODAY WITH INCREASING SOB, COUGH, WEAKNESS, FEVER, CHILLS, + SYNCOPAL EVENT AND SKIN COLOR IS PALE.)Skin Color: pale    Abnormal Results:   Labs Ordered and Resulted from Time of ED Arrival to Time of ED Departure   INFLUENZA A/B, RSV, & SARS-COV2 PCR - Abnormal       Result Value    Influenza A PCR Negative      Influenza B PCR Negative      RSV PCR Negative      SARS CoV2 PCR Positive (*)    BASIC METABOLIC PANEL - Abnormal    Sodium 135      Potassium 3.8      Chloride 102      Carbon Dioxide (CO2) 23      Anion Gap 10      Urea Nitrogen 12.6      Creatinine 0.80      GFR Estimate 89      Calcium 7.9 (*)     Glucose 107 (*)    CBC WITH PLATELETS AND DIFFERENTIAL - Abnormal    WBC Count 14.9 (*)     RBC Count 2.88 (*)     Hemoglobin 4.8 (*)     Hematocrit 18.4 (*)     MCV 64 (*)     MCH 16.7 (*)     MCHC 26.1 (*)     RDW 21.8 (*)     Platelet Count 215      % Neutrophils 86      % Lymphocytes 5      % Monocytes 8      % Eosinophils 0      % Basophils 0      % Immature Granulocytes 1      NRBCs per 100 WBC 0      Absolute Neutrophils 12.8 (*)      Absolute Lymphocytes 0.8      Absolute Monocytes 1.2      Absolute Eosinophils 0.0      Absolute Basophils 0.0      Absolute Immature Granulocytes 0.1      Absolute NRBCs 0.0     TROPONIN T, HIGH SENSITIVITY - Normal    Troponin T, High Sensitivity 10     LACTIC ACID WHOLE BLOOD   BLOOD GAS VENOUS   ROUTINE UA WITH MICROSCOPIC REFLEX TO CULTURE   TYPE AND SCREEN, ADULT    SPECIMEN EXPIRATION DATE [id number]     PREPARE RED BLOOD CELLS (UNIT)   BLOOD CULTURE   BLOOD CULTURE   ABO/RH TYPE AND SCREEN        XR Chest 2 Views    (Results Pending)   US Pelvic Complete with Transvaginal    (Results Pending)       Treatments provided: PIV, LABS, T&S, URINE, US, XRAY, COVID  Family Comments: AT BEDSIDE  OBS brochure/video discussed/provided to patient:  No  ED Medications:   Medications   sodium chloride 0.9% BOLUS 1,000 mL (1,000 mLs Intravenous $New Bag 12/24/23 1430)   sodium chloride 0.9% BOLUS 1-250 mL (has no administration in time range)       Drips infusing:  Yes  For the majority of the shift this patient was Green.   Interventions performed were NONE.    Sepsis treatment initiated: No    Cares/treatment/interventions/medications to be completed following ED care: CONTINUE TO MONITOR, VITALS, TRANSFUSE BLOOD, URINE NEEDED, I&O    ED Nurse Name: Jared Nice RN  2:33 PM

## 2023-12-25 LAB
ACINETOBACTER SPECIES: NOT DETECTED
ALBUMIN SERPL BCG-MCNC: 3.4 G/DL (ref 3.5–5.2)
ALP SERPL-CCNC: 89 U/L (ref 40–150)
ALT SERPL W P-5'-P-CCNC: 13 U/L (ref 0–50)
ANION GAP SERPL CALCULATED.3IONS-SCNC: 8 MMOL/L (ref 7–15)
ANION GAP SERPL CALCULATED.3IONS-SCNC: 9 MMOL/L (ref 7–15)
AST SERPL W P-5'-P-CCNC: 29 U/L (ref 0–45)
BASOPHILS # BLD AUTO: 0 10E3/UL (ref 0–0.2)
BASOPHILS NFR BLD AUTO: 0 %
BILIRUB SERPL-MCNC: 2.3 MG/DL
BUN SERPL-MCNC: 11.6 MG/DL (ref 6–20)
BUN SERPL-MCNC: 12.2 MG/DL (ref 6–20)
CALCIUM SERPL-MCNC: 7.7 MG/DL (ref 8.6–10)
CALCIUM SERPL-MCNC: 7.8 MG/DL (ref 8.6–10)
CHLORIDE SERPL-SCNC: 105 MMOL/L (ref 98–107)
CHLORIDE SERPL-SCNC: 106 MMOL/L (ref 98–107)
CITROBACTER SPECIES: NOT DETECTED
CREAT SERPL-MCNC: 0.53 MG/DL (ref 0.51–0.95)
CREAT SERPL-MCNC: 0.68 MG/DL (ref 0.51–0.95)
CTX-M: NOT DETECTED
DEPRECATED HCO3 PLAS-SCNC: 22 MMOL/L (ref 22–29)
DEPRECATED HCO3 PLAS-SCNC: 22 MMOL/L (ref 22–29)
EGFRCR SERPLBLD CKD-EPI 2021: >90 ML/MIN/1.73M2
EGFRCR SERPLBLD CKD-EPI 2021: >90 ML/MIN/1.73M2
ENTEROBACTER SPECIES: NOT DETECTED
EOSINOPHIL # BLD AUTO: 0 10E3/UL (ref 0–0.7)
EOSINOPHIL NFR BLD AUTO: 0 %
ERYTHROCYTE [DISTWIDTH] IN BLOOD BY AUTOMATED COUNT: 24.4 % (ref 10–15)
ERYTHROCYTE [DISTWIDTH] IN BLOOD BY AUTOMATED COUNT: 25.8 % (ref 10–15)
ESCHERICHIA COLI: DETECTED
GLUCOSE SERPL-MCNC: 146 MG/DL (ref 70–99)
GLUCOSE SERPL-MCNC: 150 MG/DL (ref 70–99)
HCT VFR BLD AUTO: 24.6 % (ref 35–47)
HCT VFR BLD AUTO: 28.7 % (ref 35–47)
HGB BLD-MCNC: 6.9 G/DL (ref 11.7–15.7)
HGB BLD-MCNC: 8.5 G/DL (ref 11.7–15.7)
IMM GRANULOCYTES # BLD: 0.1 10E3/UL
IMM GRANULOCYTES NFR BLD: 1 %
IMP: NOT DETECTED
KLEBSIELLA OXYTOCA: NOT DETECTED
KLEBSIELLA PNEUMONIAE: NOT DETECTED
KPC: NOT DETECTED
L PNEUMO1 AG UR QL IA: NEGATIVE
LACTATE SERPL-SCNC: 0.7 MMOL/L (ref 0.7–2)
LYMPHOCYTES # BLD AUTO: 0.8 10E3/UL (ref 0.8–5.3)
LYMPHOCYTES NFR BLD AUTO: 5 %
MCH RBC QN AUTO: 19.8 PG (ref 26.5–33)
MCH RBC QN AUTO: 21.1 PG (ref 26.5–33)
MCHC RBC AUTO-ENTMCNC: 28 G/DL (ref 31.5–36.5)
MCHC RBC AUTO-ENTMCNC: 29.6 G/DL (ref 31.5–36.5)
MCV RBC AUTO: 71 FL (ref 78–100)
MCV RBC AUTO: 71 FL (ref 78–100)
MONOCYTES # BLD AUTO: 0.5 10E3/UL (ref 0–1.3)
MONOCYTES NFR BLD AUTO: 4 %
NDM: NOT DETECTED
NEUTROPHILS # BLD AUTO: 14 10E3/UL (ref 1.6–8.3)
NEUTROPHILS NFR BLD AUTO: 90 %
NRBC # BLD AUTO: 0 10E3/UL
NRBC BLD AUTO-RTO: 0 /100
OXA (DETECTED/NOT DETECTED): NOT DETECTED
PLATELET # BLD AUTO: 174 10E3/UL (ref 150–450)
PLATELET # BLD AUTO: 177 10E3/UL (ref 150–450)
POTASSIUM SERPL-SCNC: 4.1 MMOL/L (ref 3.4–5.3)
POTASSIUM SERPL-SCNC: 4.3 MMOL/L (ref 3.4–5.3)
PROT SERPL-MCNC: 6.1 G/DL (ref 6.4–8.3)
PROTEUS SPECIES: NOT DETECTED
PSEUDOMONAS AERUGINOSA: NOT DETECTED
RBC # BLD AUTO: 3.48 10E6/UL (ref 3.8–5.2)
RBC # BLD AUTO: 4.02 10E6/UL (ref 3.8–5.2)
S PNEUM AG SPEC QL: NEGATIVE
SODIUM SERPL-SCNC: 136 MMOL/L (ref 135–145)
SODIUM SERPL-SCNC: 136 MMOL/L (ref 135–145)
VIM: NOT DETECTED
WBC # BLD AUTO: 15.4 10E3/UL (ref 4–11)
WBC # BLD AUTO: 16.3 10E3/UL (ref 4–11)

## 2023-12-25 PROCEDURE — 80048 BASIC METABOLIC PNL TOTAL CA: CPT | Performed by: INTERNAL MEDICINE

## 2023-12-25 PROCEDURE — 250N000011 HC RX IP 250 OP 636: Mod: JZ | Performed by: NURSE PRACTITIONER

## 2023-12-25 PROCEDURE — 250N000013 HC RX MED GY IP 250 OP 250 PS 637: Performed by: OBSTETRICS & GYNECOLOGY

## 2023-12-25 PROCEDURE — 120N000001 HC R&B MED SURG/OB

## 2023-12-25 PROCEDURE — 87899 AGENT NOS ASSAY W/OPTIC: CPT | Performed by: NURSE PRACTITIONER

## 2023-12-25 PROCEDURE — 85027 COMPLETE CBC AUTOMATED: CPT | Performed by: OBSTETRICS & GYNECOLOGY

## 2023-12-25 PROCEDURE — 36415 COLL VENOUS BLD VENIPUNCTURE: CPT | Performed by: INTERNAL MEDICINE

## 2023-12-25 PROCEDURE — 250N000011 HC RX IP 250 OP 636: Performed by: INTERNAL MEDICINE

## 2023-12-25 PROCEDURE — 258N000003 HC RX IP 258 OP 636: Performed by: INTERNAL MEDICINE

## 2023-12-25 PROCEDURE — 99207 PR NO BILLABLE SERVICE THIS VISIT: CPT | Performed by: INTERNAL MEDICINE

## 2023-12-25 PROCEDURE — P9016 RBC LEUKOCYTES REDUCED: HCPCS | Performed by: STUDENT IN AN ORGANIZED HEALTH CARE EDUCATION/TRAINING PROGRAM

## 2023-12-25 PROCEDURE — 99254 IP/OBS CNSLTJ NEW/EST MOD 60: CPT | Performed by: INTERNAL MEDICINE

## 2023-12-25 PROCEDURE — 85025 COMPLETE CBC W/AUTO DIFF WBC: CPT | Performed by: INTERNAL MEDICINE

## 2023-12-25 PROCEDURE — 36415 COLL VENOUS BLD VENIPUNCTURE: CPT | Performed by: OBSTETRICS & GYNECOLOGY

## 2023-12-25 PROCEDURE — 250N000013 HC RX MED GY IP 250 OP 250 PS 637: Performed by: INTERNAL MEDICINE

## 2023-12-25 PROCEDURE — 83605 ASSAY OF LACTIC ACID: CPT | Performed by: INTERNAL MEDICINE

## 2023-12-25 PROCEDURE — 258N000003 HC RX IP 258 OP 636: Performed by: OBSTETRICS & GYNECOLOGY

## 2023-12-25 RX ORDER — CEFTRIAXONE 1 G/1
1 INJECTION, POWDER, FOR SOLUTION INTRAMUSCULAR; INTRAVENOUS ONCE
Status: DISCONTINUED | OUTPATIENT
Start: 2023-12-25 | End: 2023-12-25 | Stop reason: DRUGHIGH

## 2023-12-25 RX ORDER — LEVOTHYROXINE SODIUM 75 UG/1
75 TABLET ORAL DAILY
Status: DISCONTINUED | OUTPATIENT
Start: 2023-12-25 | End: 2023-12-27 | Stop reason: HOSPADM

## 2023-12-25 RX ORDER — CEFTRIAXONE 2 G/1
2 INJECTION, POWDER, FOR SOLUTION INTRAMUSCULAR; INTRAVENOUS EVERY 24 HOURS
Status: DISCONTINUED | OUTPATIENT
Start: 2023-12-25 | End: 2023-12-27 | Stop reason: HOSPADM

## 2023-12-25 RX ADMIN — CEFTRIAXONE 2 G: 2 INJECTION, POWDER, FOR SOLUTION INTRAMUSCULAR; INTRAVENOUS at 15:27

## 2023-12-25 RX ADMIN — Medication 1 SPRAY: at 19:23

## 2023-12-25 RX ADMIN — TRANEXAMIC ACID 1300 MG: 650 TABLET ORAL at 21:13

## 2023-12-25 RX ADMIN — SODIUM CHLORIDE, POTASSIUM CHLORIDE, SODIUM LACTATE AND CALCIUM CHLORIDE 500 ML: 600; 310; 30; 20 INJECTION, SOLUTION INTRAVENOUS at 00:03

## 2023-12-25 RX ADMIN — LEVOTHYROXINE SODIUM 75 MCG: 0.07 TABLET ORAL at 10:16

## 2023-12-25 RX ADMIN — Medication 1 SPRAY: at 21:13

## 2023-12-25 RX ADMIN — SODIUM CHLORIDE, POTASSIUM CHLORIDE, SODIUM LACTATE AND CALCIUM CHLORIDE 1500 ML: 600; 310; 30; 20 INJECTION, SOLUTION INTRAVENOUS at 02:48

## 2023-12-25 RX ADMIN — TRANEXAMIC ACID 1300 MG: 650 TABLET ORAL at 10:16

## 2023-12-25 RX ADMIN — Medication 1 SPRAY: at 10:16

## 2023-12-25 RX ADMIN — MEGESTROL ACETATE 20 MG: 20 TABLET ORAL at 10:16

## 2023-12-25 RX ADMIN — DEXAMETHASONE SODIUM PHOSPHATE 6 MG: 10 INJECTION, SOLUTION INTRAMUSCULAR; INTRAVENOUS at 19:24

## 2023-12-25 ASSESSMENT — ACTIVITIES OF DAILY LIVING (ADL)
ADLS_ACUITY_SCORE: 37

## 2023-12-25 NOTE — PROGRESS NOTES
[facility] GYNProgress Note    Interval History   Pt is tired, coughing.  Pain is controlled.  No current fever.  Minimal appetite.  Denies pelvic pain, heavy bleeding. Overnight pads changed for light to scant blood.    Medications      artificial saliva  1 spray Mouth/Throat 4x Daily    cefTRIAXone  1 g Intravenous Q24H    dexAMETHasone  6 mg Intravenous Q24H    docusate sodium  100 mg Oral BID    lactated ringers  1,000 mL Intravenous Once    levothyroxine  75 mcg Oral QAM AC    megestrol  20 mg Oral Daily    sodium chloride (PF)  3 mL Intracatheter Q8H    tranexamic acid  1,300 mg Oral BID       Physical Exam   Temp: 97.8  F (36.6  C) Temp src: Oral BP: 100/61 Pulse: 62   Resp: 18 SpO2: 99 % O2 Device: None (Room air)    Vitals:    12/24/23 1849   Weight: 64.8 kg (142 lb 14.4 oz)     Vital Signs with Ranges  Temp:  [97.6  F (36.4  C)-103  F (39.4  C)] 97.8  F (36.6  C)  Pulse:  [] 62  Resp:  [10-29] 18  BP: ()/(36-69) 100/61  SpO2:  [90 %-100 %] 99 %  I/O last 3 completed shifts:  In: 620   Out: 300 [Urine:300]    AA0X3  Heart is regular rate and rhythm and lungs clear to auscultation    Data   Recent Labs   Lab Test 12/24/23  1347   AS Negative     Recent Labs   Lab Test 12/25/23  0248 12/24/23  1347   HGB 6.9* 4.8*     No lab results found.    Assessment/Plan: Ms. Mitali Alexander is a 47 y/o with perimenopausal menorrhagia, fibroid uterus, endometrial thickening, severe acute blood loss anemia, covid+     Severe acute blood loss anemia  -bleeding currently stable, admit hb 4.8  -would avoid estrogen, due to covid+  -megace and TXA ordered  -s/p 2U pRBCs, a third in progress, per IM     Fibroid uterus  -imaging does not suggest submucosal positioning     Thickened endometrium  -repeat imaging, SIS, hysteroscopy recommended as outpt     Covid +  -consultation to Hospitalist team--appreciated!  -GN bacteremia, on recephin, ronel Chahal MD

## 2023-12-25 NOTE — CONSULTS
Consult Note     Mitali Alexander MRN# [id number]   YOB: 1975 Age: 48 year old      Date of Admission:  12/24/2023    Primary care provider: No Ref-Primary, Physician  Requesting physician:  Carolina Chahal        Assessment and Plan:     Summary of Stay: Mitali Alexander is a 48 year old female with a history of  hypothyroidism, menorrhagia admitted on 12/24/2023 with ABL anemia to 5.4 in the setting of menorrhagia with dizziness with.  We were consulted due to fever 103    I see her in the middle of the night for hypotension with e/o acute UTI with bacteremia.  She is mildly lethargic and hypotensive.     Stat labs completed and notable for improved hgb 4.8->6.9, worsening WBC 14.9->16.3  BMP wnl and lactate 0.7    UA is positive and blood culture returned with E coli     Problem List:   UTI with bacteremia   Sepsis with hypotension/fever/bacteremia  Cont with ceftriaxone   Her sepsis is a little bit hard to tease out from her blood loss anemia and I suspect her hypotension was driven by both     COVID positivity   She apparently had a viral illness a week ago that resolved, so presumably that was covid  Out of the window for anti viral treatment and no respiratory sx at at all     Menorrhagia with ABL anemia  Did get 3 unit(s) PRBCs last night     Hypothyroidism   Resume pta levothyroxine      DVT Prophylaxis: Defer to primary service  Code Status: Full Code  Functional Status: independent  Candelario:not needed  Access:   PIV              Time spent 70 minutes reviewing epic including notes/labs/prior hx, current medications.  In addition to interviewing and examining the patient, updated patient and family regarding plan of care          Chief Complaint:     We are asked to evaluate Ms Alexander for fever        History of Present Illness:   Mitali Alexander is a 48 year old female with a history of  hypothyroidism, menorrhagia admitted on 12/24/2023 with ABL anemia to 5.4 in the setting of menorrhagia.  We were  "consulted due to fever 103    I see her in the middle of the night for hypotension with e/o acute UTI with bacteremia.  She is mildly lethargic and hypotensive.     Stat labs completed and notable for improved hgb 4.8->6.9, worsening WBC 14.9->16.3  BMP wnl and lactate 0.7    UA is positive and blood culture returned with E coli     Epic and Care everywhere were extensively reviewed        Past Medical History:     Past Medical History:   Diagnosis Date    Menorrhagia     Nonspecific abnormal results of other endocrine function study     thyroid problems in the past             Past Surgical History:   I have reviewed this patient's past surgical history          Social History:     Social History     Tobacco Use    Smoking status: Never    Smokeless tobacco: Not on file   Substance Use Topics    Alcohol use: No             Family History:   I have reviewed this patient's family history         Allergies:   No Known Allergies          Medications:     Prior to Admission medications    Medication Sig Last Dose Taking? Auth Provider Long Term End Date   acetaminophen (TYLENOL) 500 MG tablet Take 1 tablet by mouth as needed 12/23/2023 Yes Reported, Patient     DM-Doxylamine-Acetaminophen (NYQUIL COLD & FLU PO) Take by mouth as needed 12/23/2023 Yes Unknown, Entered By History     levothyroxine (SYNTHROID/LEVOTHROID) 75 MCG tablet Take 75 mcg by mouth daily 12/23/2023 Yes Unknown, Entered By History Yes                  Review of Systems:     A Comprehensive greater than 10 system review of systems was carried out.  Pertinent positives and negatives are noted above.  Otherwise negative for contributory information.           Physical Exam:   Blood pressure 100/61, pulse 62, temperature 97.8  F (36.6  C), resp. rate 18, height 1.549 m (5' 1\"), weight 64.8 kg (142 lb 14.4 oz), last menstrual period 12/24/2023, SpO2 99%, unknown if currently breastfeeding.  Exam:    General:  Pleasant nad looks stated age  Laying in bed in " nad.  Has mask in place. Normal respiratory status.  Hypotensive. Skin warm and dry no cyanosis or clubbing affect awakens to voice but appears mildly lethargic              Data:     Results for orders placed or performed during the hospital encounter of 12/24/23   XR Chest 2 Views     Status: None    Narrative    EXAM: XR CHEST 2 VIEWS  LOCATION: [facility]  DATE: 12/24/2023    INDICATION: chest pain, cough, SOB  COMPARISON: 03/19/2017      Impression    IMPRESSION: No pneumothorax or pleural effusion. No focal consolidation. Cardiomediastinal silhouette within normal limits. No acute osseous abnormality.   US Pelvic Complete with Transvaginal     Status: None    Narrative    EXAM: US PELVIC TRANSABDOMINAL AND TRANSVAGINAL  LOCATION: [facility]  DATE: 12/24/2023    INDICATION: vaginal bleeding with clots  COMPARISON: None.  TECHNIQUE: Transabdominal scans were performed. Endovaginal ultrasound was performed to better visualize the adnexa.    FINDINGS:    UTERUS: 11.0 x 5.4 x 6.1 cm. Normal in size and position with contains multiple fibroids the largest seen anteriorly measuring 1.6 x 1.2 x 1.1 cm    ENDOMETRIUM: 19 mm. Normal smooth endometrium with increased vascularity.    RIGHT OVARY: Obscured by bowel gas.    LEFT OVARY: 3.0 x 2.7 x 1.9 cm. Normal.    No significant free fluid.      Impression    IMPRESSION:  1.  Mild endometrial thickening, correlate with phase of patient's menstrual cycle, consider short-term interval follow-up.  2.  Uterine fibroids.  3.  Normal left ovary. Right ovary obscured by bowel gas.         Symptomatic Influenza A/B, RSV, & SARS-CoV2 PCR (COVID-19) Nasopharyngeal     Status: Abnormal    Specimen: Nasopharyngeal; Swab   Result Value Ref Range    Influenza A PCR Negative Negative    Influenza B PCR Negative Negative    RSV PCR Negative Negative    SARS CoV2 PCR Positive (A) Negative    Narrative    Testing was performed using the Xpert  Xpress CoV2/Flu/RSV Assay on the [employer] GeneXpert Instrument. This test should be ordered for the detection of SARS-CoV-2, influenza, and RSV viruses in individuals who meet clinical and/or epidemiological criteria. Test performance is unknown in asymptomatic patients. This test is for in vitro diagnostic use under the FDA EUA for laboratories certified under CLIA to perform high or moderate complexity testing. This test has not been FDA cleared or approved. A negative result does not rule out the presence of PCR inhibitors in the specimen or target RNA in concentration below the limit of detection for the assay. If only one viral target is positive but coinfection with multiple targets is suspected, the sample should be re-tested with another FDA cleared, approved, or authorized test, if coinfection would change clinical management. This test was validated by the [facility]. These laboratories are certified under the Clinical Laboratory Improvement Amendments of 1988 (CLIA-88) as qualified to perform high complexity laboratory testing.   Basic metabolic panel     Status: Abnormal   Result Value Ref Range    Sodium 135 135 - 145 mmol/L    Potassium 3.8 3.4 - 5.3 mmol/L    Chloride 102 98 - 107 mmol/L    Carbon Dioxide (CO2) 23 22 - 29 mmol/L    Anion Gap 10 7 - 15 mmol/L    Urea Nitrogen 12.6 6.0 - 20.0 mg/dL    Creatinine 0.80 0.51 - 0.95 mg/dL    GFR Estimate 89 >60 mL/min/1.73m2    Calcium 7.9 (L) 8.6 - 10.0 mg/dL    Glucose 107 (H) 70 - 99 mg/dL   [place] Draw     Status: None    Narrative    The following orders were created for panel order [place] Draw.  Procedure                               Abnormality         Status                     ---------                               -----------         ------                     Extra Blue Top Tube[[id number]]                              Final result               Extra Red Top Tube[[id number]]                               Final result                Extra Blood [facility] Purple ...[[id number]]                      Final result               Extra Blood Bank Purple ...[[id number]]                      Final result                 Please view results for these tests on the individual orders.   CBC with platelets and differential     Status: Abnormal   Result Value Ref Range    WBC Count 14.9 (H) 4.0 - 11.0 10e3/uL    RBC Count 2.88 (L) 3.80 - 5.20 10e6/uL    Hemoglobin 4.8 (LL) 11.7 - 15.7 g/dL    Hematocrit 18.4 (L) 35.0 - 47.0 %    MCV 64 (L) 78 - 100 fL    MCH 16.7 (L) 26.5 - 33.0 pg    MCHC 26.1 (L) 31.5 - 36.5 g/dL    RDW 21.8 (H) 10.0 - 15.0 %    Platelet Count 215 150 - 450 10e3/uL    % Neutrophils 86 %    % Lymphocytes 5 %    % Monocytes 8 %    % Eosinophils 0 %    % Basophils 0 %    % Immature Granulocytes 1 %    NRBCs per 100 WBC 0 <1 /100    Absolute Neutrophils 12.8 (H) 1.6 - 8.3 10e3/uL    Absolute Lymphocytes 0.8 0.8 - 5.3 10e3/uL    Absolute Monocytes 1.2 0.0 - 1.3 10e3/uL    Absolute Eosinophils 0.0 0.0 - 0.7 10e3/uL    Absolute Basophils 0.0 0.0 - 0.2 10e3/uL    Absolute Immature Granulocytes 0.1 <=0.4 10e3/uL    Absolute NRBCs 0.0 10e3/uL   Extra Blue Top Tube     Status: None   Result Value Ref Range    Hold Specimen [facility]    Extra Red Top Tube     Status: None   Result Value Ref Range    Hold Specimen [facility] Blood Bank Purple Top Tube     Status: None   Result Value Ref Range    Hold Specimen [facility] hospital Blood Bank Purple Top Tube     Status: None   Result Value Ref Range    Hold Specimen [facility]    Lactic acid whole blood     Status: Normal   Result Value Ref Range    Lactic Acid 0.9 0.7 - 2.0 mmol/L   Blood gas venous     Status: None   Result Value Ref Range    pH Venous 7.38 7.32 - 7.43    pCO2 Venous 41 40 - 50 mm Hg    pO2 Venous 41 25 - 47 mm Hg    Bicarbonate Venous 24 21 - 28 mmol/L    Base Excess/Deficit -0.7 -7.7 - 1.9 mmol/L    FIO2 21    UA with Microscopic reflex to Culture     Status: Abnormal    Specimen: Urine, Clean Catch    Result Value Ref Range    Color Urine Yellow Colorless, Straw, Light Yellow, Yellow    Appearance Urine Clear Clear    Glucose Urine Negative Negative mg/dL    Bilirubin Urine Negative Negative    Ketones Urine Negative Negative mg/dL    Specific Gravity Urine 1.015 1.003 - 1.035    Blood Urine Moderate (A) Negative    pH Urine 5.5 5.0 - 7.0    Protein Albumin Urine 30 (A) Negative mg/dL    Urobilinogen Urine 2.0 Normal, 2.0 mg/dL    Nitrite Urine Negative Negative    Leukocyte Esterase Urine Large (A) Negative    Bacteria Urine Few (A) None Seen /HPF    RBC Urine 30 (H) <=2 /HPF    WBC Urine 54 (H) <=5 /HPF    Narrative    Urine Culture ordered based on laboratory criteria   Troponin T, High Sensitivity     Status: Normal   Result Value Ref Range    Troponin T, High Sensitivity 10 <=14 ng/L   Extra Tube     Status: None    Narrative    The following orders were created for panel order Extra Tube.  Procedure                               Abnormality         Status                     ---------                               -----------         ------                     Extra Green Top (Lithium...[[id number]]                      Final result                 Please view results for these tests on the individual orders.   Extra Green Top (Lithium Heparin) Tube     Status: None   Result Value Ref Range    Hold Specimen [facility]    Procalcitonin     Status: Normal   Result Value Ref Range    Procalcitonin 0.46 <0.50 ng/mL   Basic metabolic panel     Status: Abnormal   Result Value Ref Range    Sodium 136 135 - 145 mmol/L    Potassium 4.1 3.4 - 5.3 mmol/L    Chloride 105 98 - 107 mmol/L    Carbon Dioxide (CO2) 22 22 - 29 mmol/L    Anion Gap 9 7 - 15 mmol/L    Urea Nitrogen 11.6 6.0 - 20.0 mg/dL    Creatinine 0.68 0.51 - 0.95 mg/dL    GFR Estimate >90 >60 mL/min/1.73m2    Calcium 7.7 (L) 8.6 - 10.0 mg/dL    Glucose 150 (H) 70 - 99 mg/dL   CBC with platelets     Status: Abnormal   Result Value Ref Range    WBC Count 16.3 (H)  4.0 - 11.0 10e3/uL    RBC Count 3.48 (L) 3.80 - 5.20 10e6/uL    Hemoglobin 6.9 (LL) 11.7 - 15.7 g/dL    Hematocrit 24.6 (L) 35.0 - 47.0 %    MCV 71 (L) 78 - 100 fL    MCH 19.8 (L) 26.5 - 33.0 pg    MCHC 28.0 (L) 31.5 - 36.5 g/dL    RDW 25.8 (H) 10.0 - 15.0 %    Platelet Count 177 150 - 450 10e3/uL   Lactic acid whole blood     Status: Normal   Result Value Ref Range    Lactic Acid 0.7 0.7 - 2.0 mmol/L   EKG 12-lead, tracing only     Status: None (Preliminary result)   Result Value Ref Range    Systolic Blood Pressure  mmHg    Diastolic Blood Pressure  mmHg    Ventricular Rate 97 BPM    Atrial Rate 97 BPM    NY Interval 118 ms    QRS Duration 84 ms     ms    QTc 449 ms    P Axis 21 degrees    R AXIS 44 degrees    T Axis 28 degrees    Interpretation ECG       Sinus rhythm  Normal ECG  No previous ECGs available     Adult Type and Screen     Status: None   Result Value Ref Range    ABO/RH(D) B POS     Antibody Screen Negative Negative    SPECIMEN EXPIRATION DATE [id number]    Prepare red blood cells (unit)     Status: None   Result Value Ref Range    Blood Component Type Red Blood Cells     Product Code X1521E18     Unit Status Transfused     Unit Number [id number]     CROSSMATCH Compatible     CODING SYSTEM [id number]     ISSUE DATE AND TIME [id number]     UNIT ABO/RH B+     UNIT TYPE ISBT 7300    Prepare red blood cells (unit)     Status: None   Result Value Ref Range    Blood Component Type Red Blood Cells     Product Code N0930Q73     Unit Status Transfused     Unit Number [id number]     CROSSMATCH Compatible     CODING SYSTEM [id number]     ISSUE DATE AND TIME [id number]     UNIT ABO/RH B+     UNIT TYPE ISBT 7300    Prepare red blood cells (unit)     Status: None   Result Value Ref Range    Blood Component Type Red Blood Cells     Product Code Y9853V27     Unit Status Transfused     Unit Number [id number]     CROSSMATCH Compatible     CODING SYSTEM [id number]     ISSUE DATE AND TIME  [id number]     UNIT ABO/RH B+     UNIT TYPE ISBT 7300    Blood Culture Peripheral Blood     Status: Abnormal (Preliminary result)    Specimen: Peripheral Blood   Result Value Ref Range    Culture Positive on the 1st day of incubation (A)     Culture Gram negative bacilli (AA)    Blood Culture Peripheral Blood     Status: Normal (Preliminary result)    Specimen: Peripheral Blood   Result Value Ref Range    Culture No growth after 12 hours    Streptococcus pneumoniae antigen     Status: Normal    Specimen: Urine, Midstream   Result Value Ref Range    Streptococcus pneumoniae antigen Negative Negative   Legionella pneumophila antigen urine     Status: Normal    Specimen: Urine, Midstream   Result Value Ref Range    Legionella pneumophila serogroup 1 urinary antigen Negative Negative   Verigene GN Panel     Status: Abnormal    Specimen: Peripheral Blood   Result Value Ref Range    Acinetobacter species Not Detected Not Detected    Citrobacter species Not Detected Not Detected    Enterobacter species Not Detected Not Detected    Proteus species Not Detected Not Detected    Escherichia coli Detected (A) Not Detected    Klebsiella pneumoniae Not Detected Not Detected    Klebsiella oxytoca Not Detected Not Detected    Pseudomonas aeruginosa Not Detected Not Detected    CTX-M Not Detected Not Detected, NA    KPC Not Detected Not Detected, NA    NDM Not Detected Not Detected, NA    VIM Not Detected Not Detected, NA    IMP Not Detected Not Detected, NA    OXA Not Detected Not Detected, NA    Narrative    Specimen tested with YesPlz!igene multiplex, gram-negative blood culture nucleic acid test for the following targets: Acinetobacter species, Citrobacter species, Enterobacter species, Proteus species, Escherichia coli, Klebsiella pneumoniae, Klebsiella oxytoca, Pseudomonas aeruginosa, and the following resistance markers: CTX-M, KPC, NDM, VIM, IMP and OXA.   CBC with Platelets & Differential     Status: Abnormal    Narrative     The following orders were created for panel order CBC with Platelets & Differential.  Procedure                               Abnormality         Status                     ---------                               -----------         ------                     CBC with platelets and d...[[id number]]  Abnormal            Final result                 Please view results for these tests on the individual orders.   ABO/Rh type and screen     Status: None    Narrative    The following orders were created for panel order ABO/Rh type and screen.  Procedure                               Abnormality         Status                     ---------                               -----------         ------                     Adult Type and Screen[[id number]]                            Final result                 Please view results for these tests on the individual orders.   CBC with platelets differential     Status: None ()    Narrative    The following orders were created for panel order CBC with platelets differential.  Procedure                               Abnormality         Status                     ---------                               -----------         ------                     CBC with platelets and d...[[id number]]                                                   Please view results for these tests on the individual orders.

## 2023-12-25 NOTE — PLAN OF CARE
"/67 (BP Location: Left arm)   Pulse 66   Temp 98.3  F (36.8  C) (Oral)   Resp 16   Ht 1.549 m (5' 1\")   Wt 64.8 kg (142 lb 14.4 oz)   LMP 12/24/2023   SpO2 99%   BMI 27.00 kg/m      VSS, PT denies pain, SoB, CP. C/o mild cough but declined intervention. + BC.   "

## 2023-12-25 NOTE — PROGRESS NOTES
Aashish Knight    Called for GNB in blood culture from  yesterday   Here with acute blood loss anemia 2/2 menorrhagia  Had fever, leukocytosis, and positive UA  Initiated on IV ceftriaxone, so with GN bacteremia in the setting of acute UTI along with ABL anemia due to menorrhagia    BPs soft despite 2 units of blood and 500 ml LR bolus  I went to see patient and she is awake and reports feeling ok     Difficult to know if low BP due to sepsis vs anemia vs combination   Stat bmp/cbc/lactic acid  Stat 1 L LR bolus which should bring us close to the 30 ml/kg

## 2023-12-25 NOTE — PROVIDER NOTIFICATION
12/25/23 0300   Critical Test Results/Notification   Critical Lab Result (Lab Name and Value) Hemoglobin 6.9   What Time Did The Lab Notify You? 0344   Date of Provider Notification 12/25/23   Time of Provider Notification 0345   Mechanism of Provider notification page   What Provider Did You Notify? Dr. Mays     Pt's bedside RN, Yandy Huffman, verbally notified by this RN.  Brit Sheikh, BSN, RN  [facility]  Medical/Telemetry/IMC

## 2023-12-25 NOTE — PLAN OF CARE
Goal Outcome Evaluation:    Pt transferred from ED at 1850. A&Ox4. Turkish speaking. Low Hgb. 2 units PRBCs given. Recheck Hgb AM. Obgyn following. VSS, except hypotensive, MD aware. Afebrile. Denies SOB/dizziness/pain. Started on IV rocephin and IV dexamethasone. Special precautions maintained. Up A1.

## 2023-12-25 NOTE — H&P
December 24, 2023    Mitali Alexander  [id number]            GYN Admit History & Physical      Mitali Alexander is a 48 year old female who presents with menorrhagia, oligomenorrhea, and fainting. Patient states that she is currently on her period and experiencing heavy flow with blood clots.  States that she typically gets her menstrual cycle every 6 months now and they are always this heavy, lasting about a week, with large clots. She wears an adult diaper to control the flow. There is no intramenstrual spotting or bleeding.  She denies any significant abdominal discomfort. Hb 4.8 in ED.    Additionally, she had a syncopal episode while using the restroom, urinating, when she was changing her pad and had a syncopal episode.  Denies hitting her head.  This has not happened before.  Patient had a viral illness about 1 week ago but symptoms improved.  Patient and family went on a trip to [place] for 3 days and now since returning, patient has been feeling unwell.  Patient has had low appetite and episodes of emesis for the past 2 days.  Also worsening cough that is dry in nature.  Patient endorses body aches and headache but no known fevers.  Bowel movements have been normal.  She denies any urinary symptoms.  Denies chest pain but does feel short of breath.  Daughter states that she appears very pale today.      US  FINDINGS:  UTERUS: 11.0 x 5.4 x 6.1 cm. Normal in size and position with contains multiple fibroids the largest seen anteriorly measuring 1.6 x 1.2 x 1.1 cm  ENDOMETRIUM: 19 mm. Normal smooth endometrium with increased vascularity.  RIGHT OVARY: Obscured by bowel gas.  LEFT OVARY: 3.0 x 2.7 x 1.9 cm. Normal.  No significant free fluid.                                                   IMPRESSION:  1.  Mild endometrial thickening, correlate with phase of patient's menstrual cycle, consider short-term interval follow-up.  2.  Uterine fibroids.  3.  Normal left ovary. Right ovary obscured by bowel  "gas.    Patient's last menstrual period was 2023.     Estimated body mass index is 27 kg/m  as calculated from the following:    Height as of this encounter: 1.549 m (5' 1\").    Weight as of this encounter: 64.8 kg (142 lb 14.4 oz).       She is a 48 year old   Her OB history:   OB History    Para Term  AB Living   4 2 2 0 0 2   SAB IAB Ectopic Multiple Live Births   0 0 0 0 0      # Outcome Date GA Lbr Chano/2nd Weight Sex Delivery Anes PTL Lv   4  10/15/97    F       3  10/18/96    F       2 Term            1 Term               Obstetric Comments    without complications         Past Medical History:   Diagnosis Date    Nonspecific abnormal results of other endocrine function study     thyroid problems in the past          Past Surgical History:   Procedure Laterality Date    ZZC NONSPECIFIC PROCEDURE      Right ovarian cyst removed         No current outpatient medications on file.       Allergies: Patient has no known allergies.      REVIEW OF SYSTEMS:  NEUROLOGIC:  Negative  EYES:  Negative  ENT:  Negative  GI:  Negative  BREAST:  Negative  :  Negative  GYN:  Negative  CV:  Negative  PULMONARY:  Negative  MUSCULOSKELETAL:  Negative  PSYCH:  Negative        Social History     Socioeconomic History    Marital status:      Spouse name: Not on file    Number of children: Not on file    Years of education: Not on file    Highest education level: Not on file   Occupational History    Not on file   Tobacco Use    Smoking status: Never    Smokeless tobacco: Not on file   Substance and Sexual Activity    Alcohol use: No    Drug use: No    Sexual activity: Yes     Partners: Male   Other Topics Concern    Not on file   Social History Narrative    Not on file     Social Determinants of Health     Financial Resource Strain: Not on file   Food Insecurity: Not on file   Transportation Needs: Not on file   Physical Activity: Not on file   Stress: Not on file   Social " Connections: Not on file   Interpersonal Safety: Not on file   Housing Stability: Not on file      Family History   Problem Relation Age of Onset    Hypertension Mother          Vitals:   VSS/AF    Alert Awake in NAD  HEENT grossly normal  Neck: no lymphadenopathy or thryoidomegaly  Lungs CTAB  Back no spinal or CVAT  Heart RRR  ABD gravid, nontender on exam  EXT:  no edema or calf tenderness  Neuro:  grossly intact    Assessment/Plan: Ms. Mitali Alexander is a 49 y/o with perimenopausal menorrhagia, fibroid uterus, endometrial thickening, severe acute blood loss anemia, covid+    Severe acute blood loss anemia  -bleeding currently stable, admit hb 4.8  -would avoid estrogen, due to covid+  -megace and TXA ordered  -s/p 2U pRBCs    Fibroid uterus  -imaging does not suggest submucosal positioning    Thickened endometrium  -repeat imaging, SIS, hysteroscopy recommended as outpt    Covid +  -consultation to Hospitalist team--appreciated!      Carolina Chahal MD   Dept of OB/GYN  December 24, 2023

## 2023-12-25 NOTE — PROVIDER NOTIFICATION
DATE/TIME OF CALL RECEIVED FROM LAB:  12/25/23 at 9:50 AM   LAB TEST:  Blood Culture  LAB VALUE:  Positive for Gram negative bacilli  PROVIDER NOTIFIED?: Yes  PROVIDER NAME: Bayron  DATE/TIME LAB VALUE REPORTED TO PROVIDER: 0945  MECHANISM OF PROVIDER NOTIFICATION: Page  PROVIDER RESPONSE: Awaiting response

## 2023-12-25 NOTE — PLAN OF CARE
Goal Outcome Evaluation:      Plan of Care Reviewed With: patient      VSS with the exception of soft b/p. On ra. IV bolus x 2 given for a total of 1500ml. Completed 3 total units of blood since admission. Pt lethargic. Opens eyes to voice or gentle touch. LS diminished with infrequent cough. Pt is pale. One assist and GB to bathroom. RPIV x 2. Regular diet. MD notified of positive BC.

## 2023-12-25 NOTE — PLAN OF CARE
[facility]    ED Boarding Nurse Handoff Addendum Report:    Date/time: 12/24/2023, 6:31 PM    Activity Level: assist of 1    Fall Risk: Yes:  activity supervised    Active Infusions: Blood infusing 100 mL/hr    Current Meds Due: None    Current care needs: Monitor temp and general weakness    Oxygen requirements (liters/min and/or FiO2): NA    Respiratory status: Room air    Vital signs (within last 30 minutes):    Vitals:    12/24/23 1625 12/24/23 1645 12/24/23 1738 12/24/23 1805   BP: 112/59 93/43 100/45 97/52   Pulse: 105 105 110 102   Resp: 10 11 17 18   Temp:  (!) 102.7  F (39.3  C) (!) 103  F (39.4  C) (!) 102.3  F (39.1  C)   TempSrc:  Oral  Oral   SpO2: 100% 100%  100%       Focused assessment within last 30 minutes:    AOx4, Colombian speaking daughter at bedside, blood infusing tolerating without any blood transfusion reactions, Pt requested a MD letter to excuse her for work, MD notified.     ED Boarding Nurse name: Caitlin Weaver RN

## 2023-12-25 NOTE — PROGRESS NOTES
[facility]    Medicine Progress Note - Hospitalist Service    Date of Admission:  12/24/2023    Assessment & Plan     Mitali Alexander is a 48 year old female with history of hypothyroidism and menorrhagia. She presented to the ED on 12/24/2023 for evaluation of cough and syncope. ED evaluation showed WBC 14.9, hemoglobin 4.8, and positive COVID test. She was admitted for further cares. She was transfused 3 units of red blood cells and started on Megace and tranexamic acid for acute blood loss anemia and menorrhagia. Hemoglobin vanessa to 8.5. The Hospitalist service was consulted for COVID. She was treated with dexamethasone for COVID. Hospital course was complicated by fever to 103 and tachycardia. Evaluation suggested UTI. She was started on Rocephin. Blood culture grew E.coli.        Problem List:     UTI with E. Coli bacteremia   Sepsis (hypotension/, ever, and bacteremia)  -Continue Rocephin  -Monitor blood culture susceptibilities  -Monitor vital signs closely    Menorrhagia  Acute blood loss anemia  -Continue Megace and tranexamic acid per Gynecology  -S/p transfusion of 3 units of RBC  -AM CBC     COVID 19 infection   -She apparently had a viral illness a week ago that resolved, so presumably that was covid  -Out of the window for anti viral treatment   -Continue dexamethasone;  ok to change to oral     Hypothyroidism   -Continue pta levothyroxine             Diet: Combination Diet Regular Diet Adult    DVT Prophylaxis: Pneumatic Compression Devices  Candelario Catheter: Not present  Lines: None     Cardiac Monitoring: None  Code Status: Full Code      Clinically Significant Risk Factors Present on Admission          # Hypocalcemia: Lowest Ca = 7.7 mg/dL in last 2 days, will monitor and replace as appropriate     # Hypoalbuminemia: Lowest albumin = 3.4 g/dL at 12/25/2023  7:47 AM, will monitor as appropriate          # Overweight: Estimated body mass index is 27 kg/m  as calculated from the  "following:    Height as of this encounter: 1.549 m (5' 1\").    Weight as of this encounter: 64.8 kg (142 lb 14.4 oz).              Disposition Plan      Expected Discharge Date: 12/26/2023                    Xavier Verma MD  Hospitalist Service  [facility]  Securely message with [employer] (more info)  Text page via [employer] Paging/Directory   ______________________________________________________________________    Interval History   Feeling better today. Eating ok. No cough or shortness of breath.     Physical Exam   Vital Signs: Temp: 98.3  F (36.8  C) Temp src: Oral BP: 112/67 Pulse: 66   Resp: 16 SpO2: 99 % O2 Device: None (Room air)    Weight: 142 lbs 14.4 oz    GENERAL:  Comfortable. Cooperative.  PSYCH: pleasant, oriented, No acute distress.  EYES: PERRLA, Normal conjunctiva.  HEART:  Regular rate and rhythm. No JVD. Pulses normal. No edema.  LUNGS:  Clear to auscultation, normal Respiratory effort.  ABDOMEN:  Soft, no hepatosplenomegaly, normal bowel sounds.  EXTREMETIES: No clubbing, cyanosis or ischemia  SKIN:  Dry to touch, No rash.      Medical Decision Making       40 MINUTES SPENT BY ME on the date of service doing chart review, history, exam, documentation & further activities per the note.      Data     I have personally reviewed the following data over the past 24 hrs:    15.4 (H)  \   8.5 (L)   / 174     136 106 12.2 /  146 (H)   4.3 22 0.53 \     ALT: 13 AST: 29 AP: 89 TBILI: 2.3 (H)   ALB: 3.4 (L) TOT PROTEIN: 6.1 (L) LIPASE: N/A     Procal: N/A CRP: N/A Lactic Acid: 0.7         Imaging results reviewed over the past 24 hrs:   Recent Results (from the past 24 hour(s))   XR Chest 2 Views    Narrative    EXAM: XR CHEST 2 VIEWS  LOCATION: [facility]  DATE: 12/24/2023    INDICATION: chest pain, cough, SOB  COMPARISON: 03/19/2017      Impression    IMPRESSION: No pneumothorax or pleural effusion. No focal consolidation. Cardiomediastinal silhouette within " normal limits. No acute osseous abnormality.   US Pelvic Complete with Transvaginal    Narrative    EXAM: US PELVIC TRANSABDOMINAL AND TRANSVAGINAL  LOCATION: [facility]  DATE: 12/24/2023    INDICATION: vaginal bleeding with clots  COMPARISON: None.  TECHNIQUE: Transabdominal scans were performed. Endovaginal ultrasound was performed to better visualize the adnexa.    FINDINGS:    UTERUS: 11.0 x 5.4 x 6.1 cm. Normal in size and position with contains multiple fibroids the largest seen anteriorly measuring 1.6 x 1.2 x 1.1 cm    ENDOMETRIUM: 19 mm. Normal smooth endometrium with increased vascularity.    RIGHT OVARY: Obscured by bowel gas.    LEFT OVARY: 3.0 x 2.7 x 1.9 cm. Normal.    No significant free fluid.      Impression    IMPRESSION:  1.  Mild endometrial thickening, correlate with phase of patient's menstrual cycle, consider short-term interval follow-up.  2.  Uterine fibroids.  3.  Normal left ovary. Right ovary obscured by bowel gas.           Recent Labs   Lab 12/25/23  0747 12/25/23  0248 12/24/23  1347   WBC 15.4* 16.3* 14.9*   HGB 8.5* 6.9* 4.8*   MCV 71* 71* 64*    177 215    136 135   POTASSIUM 4.3 4.1 3.8   CHLORIDE 106 105 102   CO2 22 22 23   BUN 12.2 11.6 12.6   CR 0.53 0.68 0.80   ANIONGAP 8 9 10   KATHRYN 7.8* 7.7* 7.9*   * 150* 107*   ALBUMIN 3.4*  --   --    PROTTOTAL 6.1*  --   --    BILITOTAL 2.3*  --   --    ALKPHOS 89  --   --    ALT 13  --   --    AST 29  --   --

## 2023-12-26 ENCOUNTER — APPOINTMENT (OUTPATIENT)
Dept: INTERPRETER SERVICES | Facility: CLINIC | Age: 48
End: 2023-12-26

## 2023-12-26 LAB
ATRIAL RATE - MUSE: 97 BPM
DIASTOLIC BLOOD PRESSURE - MUSE: NORMAL MMHG
ERYTHROCYTE [DISTWIDTH] IN BLOOD BY AUTOMATED COUNT: 25 % (ref 10–15)
HCT VFR BLD AUTO: 28.1 % (ref 35–47)
HGB BLD-MCNC: 8.3 G/DL (ref 11.7–15.7)
INTERPRETATION ECG - MUSE: NORMAL
MCH RBC QN AUTO: 20.6 PG (ref 26.5–33)
MCHC RBC AUTO-ENTMCNC: 29.5 G/DL (ref 31.5–36.5)
MCV RBC AUTO: 70 FL (ref 78–100)
P AXIS - MUSE: 21 DEGREES
PLATELET # BLD AUTO: 216 10E3/UL (ref 150–450)
PR INTERVAL - MUSE: 118 MS
QRS DURATION - MUSE: 84 MS
QT - MUSE: 354 MS
QTC - MUSE: 449 MS
R AXIS - MUSE: 44 DEGREES
RBC # BLD AUTO: 4.02 10E6/UL (ref 3.8–5.2)
SYSTOLIC BLOOD PRESSURE - MUSE: NORMAL MMHG
T AXIS - MUSE: 28 DEGREES
VENTRICULAR RATE- MUSE: 97 BPM
WBC # BLD AUTO: 18.3 10E3/UL (ref 4–11)

## 2023-12-26 PROCEDURE — 85027 COMPLETE CBC AUTOMATED: CPT | Performed by: OBSTETRICS & GYNECOLOGY

## 2023-12-26 PROCEDURE — 250N000013 HC RX MED GY IP 250 OP 250 PS 637: Performed by: OBSTETRICS & GYNECOLOGY

## 2023-12-26 PROCEDURE — 250N000011 HC RX IP 250 OP 636: Performed by: INTERNAL MEDICINE

## 2023-12-26 PROCEDURE — 99232 SBSQ HOSP IP/OBS MODERATE 35: CPT | Performed by: INTERNAL MEDICINE

## 2023-12-26 PROCEDURE — 36415 COLL VENOUS BLD VENIPUNCTURE: CPT | Performed by: OBSTETRICS & GYNECOLOGY

## 2023-12-26 PROCEDURE — 120N000001 HC R&B MED SURG/OB

## 2023-12-26 PROCEDURE — 250N000013 HC RX MED GY IP 250 OP 250 PS 637: Performed by: INTERNAL MEDICINE

## 2023-12-26 RX ADMIN — Medication 1 SPRAY: at 17:18

## 2023-12-26 RX ADMIN — GUAIFENESIN SYRUP AND DEXTROMETHORPHAN 10 ML: 100; 10 SYRUP ORAL at 21:28

## 2023-12-26 RX ADMIN — Medication 1 SPRAY: at 09:24

## 2023-12-26 RX ADMIN — TRANEXAMIC ACID 1300 MG: 650 TABLET ORAL at 21:24

## 2023-12-26 RX ADMIN — MEGESTROL ACETATE 20 MG: 20 TABLET ORAL at 09:22

## 2023-12-26 RX ADMIN — Medication 1 SPRAY: at 21:23

## 2023-12-26 RX ADMIN — TRANEXAMIC ACID 1300 MG: 650 TABLET ORAL at 09:22

## 2023-12-26 RX ADMIN — DOCUSATE SODIUM 100 MG: 100 CAPSULE, LIQUID FILLED ORAL at 09:22

## 2023-12-26 RX ADMIN — LEVOTHYROXINE SODIUM 75 MCG: 0.07 TABLET ORAL at 09:22

## 2023-12-26 RX ADMIN — CEFTRIAXONE 2 G: 2 INJECTION, POWDER, FOR SOLUTION INTRAMUSCULAR; INTRAVENOUS at 14:54

## 2023-12-26 RX ADMIN — Medication 1 SPRAY: at 14:54

## 2023-12-26 ASSESSMENT — ACTIVITIES OF DAILY LIVING (ADL)
ADLS_ACUITY_SCORE: 37

## 2023-12-26 NOTE — PROGRESS NOTES
GYN PROGRESS NOTE      Pt doing well. Pain controlled. Reports scant bleeding today. Wonders if she can follow up with our group. Pt reassured she is welcomed and information will be provided upon discharge. Hoping to be discharged home today or tomorrow. Has no further questions today.     Vitals:    12/26/23 0300 12/26/23 0406 12/26/23 0723 12/26/23 0738   BP:  107/56  129/72   BP Location:  Left arm  Right arm   Patient Position:       Cuff Size:       Pulse:  67  60   Resp: 18 18  18   Temp:  97.6  F (36.4  C)  98.3  F (36.8  C)   TempSrc:  Oral  Oral   SpO2:  99% 97% 99%   Weight:       Height:           General A&O x 3, cooperative  CVS normal perfusion  Respiratory - normal breathing noted  Abdomen non distended      A/P 48 year old s/p HD2 here with UTI/bacteremia/sepsis and AUB-HMB      Bacteremia/sepsis/UTI  - managed per primary team    AUB  - pt aware she needs to follow up with Gyn as OP  - continue on Megace  - s/p 1 dose of TXA  - s/p 3 PRBCs    Covid infection  - management per primary team    Per primary team  - Home once blood culture susceptibilities are back with appropriate antibiotic to complete a 14 day course of treatment.     Dr. Talya Haider  [phone]

## 2023-12-26 NOTE — PLAN OF CARE
Goal Outcome Evaluation:      Plan of Care Reviewed With: patient    Overall Patient Progress: improvingOverall Patient Progress: improving     Care from [id number]  Inpatient Progress Note    ORIENTATION: Aox4.  VSS  PAIN: Pt denies pain, CP, SOB, n/v.  O2: RA  GI/: Continent.  ACTIVITY: Not OOB this shift  DIET: Regular  LINES/DRAINS: 2 R PIV SL  PLAN: OBGYN following; IV rocephin Q24 hr. IV dexamethasone Q24 hr.

## 2023-12-26 NOTE — PROGRESS NOTES
[facility]    Medicine Progress Note - Hospitalist Service    Date of Admission:  12/24/2023    Assessment & Plan   Mitali Alexander is a 48 year old female with history of hypothyroidism and menorrhagia. She presented to the ED on 12/24/2023 for evaluation of cough and syncope. ED evaluation showed WBC 14.9, hemoglobin 4.8, and positive COVID test. She was admitted for further cares. She was transfused 3 units of red blood cells and started on Megace and tranexamic acid for acute blood loss anemia and menorrhagia. Hemoglobin vanessa to 8.5. The Hospitalist service was consulted for COVID. She was treated with dexamethasone for COVID. Hospital course was complicated by fever to 103 and tachycardia. Evaluation suggested UTI. She was started on Rocephin. Blood culture grew E.coli.        Problem List:     UTI with E. Coli bacteremia   Sepsis (hypotension/, ever, and bacteremia)  -Continue Rocephin   -Monitor blood culture susceptibilities  -Monitor vital signs closely  -Once E. Coli susceptibilities are back can transition to an appropriate oral antibiotic and  plan to complete a 14 day course of treatment for E. Coli UTI with bacteremia  -Patient can likely discharge once susceptibilities are back (later today? Or tomorrow?)    Menorrhagia  Acute blood loss anemia  -Continue Megace and tranexamic acid per Gynecology  -S/p transfusion of 3 units of RBC  -Bleeding stopped last night per patient     COVID 19 infection   -She apparently had a viral illness a week ago that resolved, so presumably that was covid  -Out of the window for anti viral treatment   -Was started dexamethasone on admission but no hypoxia or infiltrate and BG up a little so ok to stop     Hypothyroidism   -Continue pta levothyroxine     Disposition  -Up in room today  -Home once blood culture susceptibilities are back with appropriate antibiotic to complete a 14 day course of treatment.        Diet: Combination Diet Regular Diet  "Adult    DVT Prophylaxis: Pneumatic Compression Devices and ambulate  Candelario Catheter: Not present  Lines: None     Cardiac Monitoring: None  Code Status: Full Code      Clinically Significant Risk Factors          # Hypocalcemia: Lowest Ca = 7.7 mg/dL in last 2 days, will monitor and replace as appropriate     # Hypoalbuminemia: Lowest albumin = 3.4 g/dL at 12/25/2023  7:47 AM, will monitor as appropriate            # Overweight: Estimated body mass index is 27 kg/m  as calculated from the following:    Height as of this encounter: 1.549 m (5' 1\").    Weight as of this encounter: 64.8 kg (142 lb 14.4 oz)., PRESENT ON ADMISSION            Disposition Plan     Expected Discharge Date: 12/26/2023                    Xavier Verma MD  Hospitalist Service  [facility]  Securely message with [employer] (more info)  Text page via [facility] Paging/Directory   ______________________________________________________________________    Interval History   No new problems. Bleeding stopped last night. Some cough but no shortness of breath, back pain, or abdominal pain.     Physical Exam   Vital Signs: Temp: 98.3  F (36.8  C) Temp src: Oral BP: 129/72 Pulse: 60   Resp: 18 SpO2: 99 % O2 Device: None (Room air)    Weight: 142 lbs 14.4 oz    GENERAL:  Comfortable. Cooperative.  PSYCH: pleasant, oriented, No acute distress.  EYES: PERRLA, Normal conjunctiva.  HEART:  Regular rate and rhythm. No JVD. Pulses normal. No edema.  LUNGS:  Clear to auscultation, normal Respiratory effort.  ABDOMEN:  Soft, no hepatosplenomegaly, normal bowel sounds.  EXTREMETIES: No clubbing, cyanosis or ischemia  SKIN:  Dry to touch, No rash.      Medical Decision Making       45 MINUTES SPENT BY ME on the date of service doing chart review, history, exam, documentation & further activities per the note.      Data         Imaging results reviewed over the past 24 hrs:   No results found for this or any previous visit (from the past 24 " hour(s)).  Recent Labs   Lab 12/25/23  0747 12/25/23  0248 12/24/23  1347   WBC 15.4* 16.3* 14.9*   HGB 8.5* 6.9* 4.8*   MCV 71* 71* 64*    177 215    136 135   POTASSIUM 4.3 4.1 3.8   CHLORIDE 106 105 102   CO2 22 22 23   BUN 12.2 11.6 12.6   CR 0.53 0.68 0.80   ANIONGAP 8 9 10   KATHRYN 7.8* 7.7* 7.9*   * 150* 107*   ALBUMIN 3.4*  --   --    PROTTOTAL 6.1*  --   --    BILITOTAL 2.3*  --   --    ALKPHOS 89  --   --    ALT 13  --   --    AST 29  --   --

## 2023-12-26 NOTE — PLAN OF CARE
"Goal Outcome Evaluation:      Plan of Care Reviewed With: patient    Overall Patient Progress: improvingOverall Patient Progress: improving       /72 (BP Location: Right arm)   Pulse 60   Temp 98.3  F (36.8  C) (Oral)   Resp 18   Ht 1.549 m (5' 1\")   Wt 64.8 kg (142 lb 14.4 oz)   LMP 12/24/2023   SpO2 99%   BMI 27.00 kg/m      General: pt alert and decision making. VSS on RA. Denies pain.  Neuro: AxOx4  Resp: RA. Lungs clear, intermittent dry cough non-productive. Denies SoB.  Card: WDL, pulses 2+. Denies feeling dizziness / light headed.  GI /  - continent of b/b. Denies abdominal pain. BS normoactive. No ozzy / uterine bleeding noted  Skin - pale, intact  Assist of 1-2 walker. Weakness per pt.  Plan - abx for positive cultures, switch to PO abx when cultures result per MD.          "

## 2023-12-26 NOTE — PLAN OF CARE
Goal Outcome Evaluation:    A&Ox4. VSS on RA, except soft Bps. Afebrile. Denies pain/SOB. Infrequent cough. Hgb increased to 8.4. Obgyn following. Positive blood cultures. On IV rocephin. On IV dexamethasone. Up SBA.      Plan of Care Reviewed With: patient    Overall Patient Progress: improving

## 2023-12-27 ENCOUNTER — APPOINTMENT (OUTPATIENT)
Dept: INTERPRETER SERVICES | Facility: CLINIC | Age: 48
End: 2023-12-27

## 2023-12-27 VITALS
HEIGHT: 61 IN | TEMPERATURE: 99.1 F | RESPIRATION RATE: 19 BRPM | OXYGEN SATURATION: 98 % | SYSTOLIC BLOOD PRESSURE: 87 MMHG | HEART RATE: 71 BPM | DIASTOLIC BLOOD PRESSURE: 56 MMHG | BODY MASS INDEX: 26.98 KG/M2 | WEIGHT: 142.9 LBS

## 2023-12-27 LAB
ANION GAP SERPL CALCULATED.3IONS-SCNC: 9 MMOL/L (ref 7–15)
BACTERIA BLD CULT: ABNORMAL
BACTERIA UR CULT: ABNORMAL
BUN SERPL-MCNC: 19.5 MG/DL (ref 6–20)
CALCIUM SERPL-MCNC: 8.2 MG/DL (ref 8.6–10)
CHLORIDE SERPL-SCNC: 106 MMOL/L (ref 98–107)
CREAT SERPL-MCNC: 0.66 MG/DL (ref 0.51–0.95)
DEPRECATED HCO3 PLAS-SCNC: 23 MMOL/L (ref 22–29)
EGFRCR SERPLBLD CKD-EPI 2021: >90 ML/MIN/1.73M2
ERYTHROCYTE [DISTWIDTH] IN BLOOD BY AUTOMATED COUNT: 26.3 % (ref 10–15)
GLUCOSE SERPL-MCNC: 88 MG/DL (ref 70–99)
HCT VFR BLD AUTO: 29 % (ref 35–47)
HGB BLD-MCNC: 8.4 G/DL (ref 11.7–15.7)
IL6 SERPL-MCNC: 252.19 PG/ML
MCH RBC QN AUTO: 20.5 PG (ref 26.5–33)
MCHC RBC AUTO-ENTMCNC: 29 G/DL (ref 31.5–36.5)
MCV RBC AUTO: 71 FL (ref 78–100)
PLATELET # BLD AUTO: 221 10E3/UL (ref 150–450)
POTASSIUM SERPL-SCNC: 3.8 MMOL/L (ref 3.4–5.3)
RBC # BLD AUTO: 4.09 10E6/UL (ref 3.8–5.2)
SODIUM SERPL-SCNC: 138 MMOL/L (ref 135–145)
WBC # BLD AUTO: 12.6 10E3/UL (ref 4–11)

## 2023-12-27 PROCEDURE — 99232 SBSQ HOSP IP/OBS MODERATE 35: CPT | Performed by: HOSPITALIST

## 2023-12-27 PROCEDURE — 250N000013 HC RX MED GY IP 250 OP 250 PS 637: Performed by: INTERNAL MEDICINE

## 2023-12-27 PROCEDURE — 85041 AUTOMATED RBC COUNT: CPT | Performed by: INTERNAL MEDICINE

## 2023-12-27 PROCEDURE — 250N000013 HC RX MED GY IP 250 OP 250 PS 637: Performed by: OBSTETRICS & GYNECOLOGY

## 2023-12-27 PROCEDURE — 36415 COLL VENOUS BLD VENIPUNCTURE: CPT | Performed by: INTERNAL MEDICINE

## 2023-12-27 PROCEDURE — 250N000011 HC RX IP 250 OP 636: Performed by: INTERNAL MEDICINE

## 2023-12-27 PROCEDURE — 80048 BASIC METABOLIC PNL TOTAL CA: CPT | Performed by: INTERNAL MEDICINE

## 2023-12-27 RX ORDER — BENZONATATE 200 MG/1
200 CAPSULE ORAL 3 TIMES DAILY PRN
Qty: 30 CAPSULE | Refills: 0 | Status: SHIPPED | OUTPATIENT
Start: 2023-12-27 | End: 2024-01-10

## 2023-12-27 RX ORDER — CIPROFLOXACIN 500 MG/1
500 TABLET, FILM COATED ORAL 2 TIMES DAILY
Qty: 28 TABLET | Refills: 0 | Status: SHIPPED | OUTPATIENT
Start: 2023-12-27 | End: 2024-01-10

## 2023-12-27 RX ORDER — GUAIFENESIN 600 MG/1
1200 TABLET, EXTENDED RELEASE ORAL 2 TIMES DAILY
Qty: 56 TABLET | Refills: 0 | Status: SHIPPED | OUTPATIENT
Start: 2023-12-27 | End: 2024-01-10

## 2023-12-27 RX ORDER — MEGESTROL ACETATE 20 MG/1
20 TABLET ORAL DAILY
Qty: 30 TABLET | Refills: 1 | Status: SHIPPED | OUTPATIENT
Start: 2023-12-28

## 2023-12-27 RX ADMIN — TRANEXAMIC ACID 1300 MG: 650 TABLET ORAL at 09:36

## 2023-12-27 RX ADMIN — DOCUSATE SODIUM 100 MG: 100 CAPSULE, LIQUID FILLED ORAL at 09:36

## 2023-12-27 RX ADMIN — GUAIFENESIN SYRUP AND DEXTROMETHORPHAN 10 ML: 100; 10 SYRUP ORAL at 14:16

## 2023-12-27 RX ADMIN — Medication 1 SPRAY: at 09:36

## 2023-12-27 RX ADMIN — LEVOTHYROXINE SODIUM 75 MCG: 0.07 TABLET ORAL at 09:36

## 2023-12-27 RX ADMIN — CEFTRIAXONE 2 G: 2 INJECTION, POWDER, FOR SOLUTION INTRAMUSCULAR; INTRAVENOUS at 13:04

## 2023-12-27 RX ADMIN — GUAIFENESIN SYRUP AND DEXTROMETHORPHAN 10 ML: 100; 10 SYRUP ORAL at 09:36

## 2023-12-27 RX ADMIN — Medication 1 SPRAY: at 13:03

## 2023-12-27 RX ADMIN — MEGESTROL ACETATE 20 MG: 20 TABLET ORAL at 09:36

## 2023-12-27 ASSESSMENT — ACTIVITIES OF DAILY LIVING (ADL)
ADLS_ACUITY_SCORE: 38
ADLS_ACUITY_SCORE: 37
ADLS_ACUITY_SCORE: 38
ADLS_ACUITY_SCORE: 37
ADLS_ACUITY_SCORE: 37
ADLS_ACUITY_SCORE: 38

## 2023-12-27 NOTE — PLAN OF CARE
Goal Outcome Evaluation:      Plan of Care Reviewed With: patient      Shift: 7pm-7am    Vitals: Temp: 98.8  F (37.1  C) Temp src: Oral BP: 107/52 Pulse: 61   Resp: 20 SpO2: 97 % O2 Device: None (Room air)       Orientation: alert  Pain: denies  Activity: SBA/Ind - calls for help appropriately   Resp: complains of cough PRN robitussin given, denies SOB  Diet: regular diet- needs assistance ordering   How to take meds: whole w/ fluids   GI & : continent of urine, no bm this shift - refused docusate sodium   Skin: no skin concerns - scattered bruising   Lines: IV on the right   Plan: awaiting blood culture results before discharge

## 2023-12-27 NOTE — PROGRESS NOTES
"BP (!) 87/56 (BP Location: Left arm)   Pulse 71   Temp 99.1  F (37.3  C) (Oral)   Resp 20   Ht 1.549 m (5' 1\")   Wt 64.8 kg (142 lb 14.4 oz)   LMP 12/24/2023   SpO2 100%   BMI 27.00 kg/m    General: patient sustains cough dry non productive, gave prn guaifenesin oral suspension x2. Denies pain. Lungs clear. Pt alert and decision making. Pt ready to discharge per MD and care team. All issues resolved in this encounter.     Pt discharging with daughter as transport at or around 3-5 pm. All medications, instructions, and teaching reviewed with patient via  services. All belongings returned to patient. Patient verbalizes understanding of financial services follow up call.     Work note given to pt  "

## 2023-12-27 NOTE — PROGRESS NOTES
[facility]    Hospitalist Progress Note      Assessment & Plan   Mitali Alexander is a 48 year old female with history of hypothyroidism and menorrhagia. She presented to the ED on 12/24/2023 for evaluation of cough and syncope. ED evaluation showed WBC 14.9, hemoglobin 4.8, and positive COVID test. She was admitted for further cares by OB/GYN team.     She was transfused 3 units of red blood cells and started on Megace and tranexamic acid for acute blood loss anemia and menorrhagia. Hemoglobin vanessa to 8.5. The Hospitalist service was consulted for COVID. She was treated with dexamethasone for COVID. Hospital course was complicated by fever to 103 and tachycardia. Evaluation suggested UTI. She was started on Rocephin. Blood culture grew E.coli.     #UTI with E. Coli bacteremia.  Sepsis (hypotension/, ever, and bacteremia)  -Pt was treated with ceftriaxone while inpatient.  Susceptibilities did show it was susceptible to ciprofloxacin.  She will complete 14 day course of ciprofloxacin on discharge.  Recommended follow up in outpatient setting with repeat CBC and BMP.    -On day of discharge, patient felt generally well but with cough.  Not-productive.  She did not feel short of breath or have chest pain.  She was saturating well on RA.  She was prescribed anti-tussive therapy for cough suspected related to her COVID19 infection.       #Menorrhagia/  Acute blood loss anemia  -Continue Megace and tranexamic acid per Gynecology  -S/p transfusion of 3 units of RBC  -Bleeding stopped on evening of 12/25.  Hgb stable. Follow up with OB/Gyn in outpatient setting in likely next week or so with repeat labs.      #COVID 19 infection   -She apparently had a viral illness a week ago that resolved, so presumably that was covid  -Out of the window for anti viral treatment   -Was started dexamethasone on admission but no hypoxia or infiltrate and BG up a little so stopped.   -Prescribed anti-tussives as above.       #Hypothyroidism   -Continue pta levothyroxine     Discussed with on call OB/GYN team.  OK for discharge from hospital medicine perspective.  She will complete 14 days of oral abx for her E coli bacteremia.          Dispo: Likely discharge today.  Discussed with primary OB team.   DVT Prophylaxis: Pneumatic Compression Devices and ambulate  Code Status: Full Code      I did try to call daughter on 12/27 but there was no answer.     Ben Sims MD    Interval History   Seen with [employer] .     Pt feels OK but with non-productive cough. No fevers/chills. No chest pain. No SOB. No AP, n/v.  Planning for discharge today.     -Data reviewed today: I reviewed all new labs and imaging results over the last 24 hours. I personally reviewed     Physical Exam   Temp: 98.4  F (36.9  C) Temp src: Oral BP: 98/71 Pulse: 62   Resp: 18 SpO2: 97 % O2 Device: None (Room air)    Vitals:    12/24/23 1849   Weight: 64.8 kg (142 lb 14.4 oz)     Vital Signs with Ranges  Temp:  [98.1  F (36.7  C)-99.1  F (37.3  C)] 98.4  F (36.9  C)  Pulse:  [51-69] 62  Resp:  [18-22] 18  BP: ()/(52-71) 98/71  SpO2:  [97 %-100 %] 97 %  No intake/output data recorded.    GENERAL:  Comfortable. Cooperative.  Answers appropriately through .   PSYCH: pleasant, oriented, No acute distress.  EYES: PERRLA, Normal conjunctiva.  HEART:  Regular rate and rhythm. No JVD. Pulses normal. No edema.  LUNGS:  Clear to auscultation, normal Respiratory effort.  ABDOMEN:  Soft, no hepatosplenomegaly, normal bowel sounds.  EXTREMETIES: No clubbing, cyanosis or ischemia  SKIN:  Dry to touch, No rash.    Medications      artificial saliva  1 spray Mouth/Throat 4x Daily    cefTRIAXone  2 g Intravenous Q24H    docusate sodium  100 mg Oral BID    lactated ringers  1,000 mL Intravenous Once    levothyroxine  75 mcg Oral Daily    megestrol  20 mg Oral Daily    sodium chloride (PF)  3 mL Intracatheter Q8H    tranexamic acid  1,300 mg Oral BID       Data    Recent Labs   Lab 12/27/23  0822 12/26/23  0811 12/25/23  0747 12/25/23  0248   WBC 12.6* 18.3* 15.4* 16.3*   HGB 8.4* 8.3* 8.5* 6.9*   MCV 71* 70* 71* 71*    216 174 177     --  136 136   POTASSIUM 3.8  --  4.3 4.1   CHLORIDE 106  --  106 105   CO2 23  --  22 22   BUN 19.5  --  12.2 11.6   CR 0.66  --  0.53 0.68   ANIONGAP 9  --  8 9   KATHRYN 8.2*  --  7.8* 7.7*   GLC 88  --  146* 150*   ALBUMIN  --   --  3.4*  --    PROTTOTAL  --   --  6.1*  --    BILITOTAL  --   --  2.3*  --    ALKPHOS  --   --  89  --    ALT  --   --  13  --    AST  --   --  29  --        No results found for this or any previous visit (from the past 24 hour(s)).

## 2023-12-27 NOTE — PROGRESS NOTES
[facility] GYN Progress Note    Interval History   Doing well.  Pain is well-controlled.  No fevers.  Good appetite.  Denies chest pain, shortness of breath, nausea or vomiting.  Ambulatory.  Reports that bleeding has stopped.    Medications      artificial saliva  1 spray Mouth/Throat 4x Daily    cefTRIAXone  2 g Intravenous Q24H    docusate sodium  100 mg Oral BID    lactated ringers  1,000 mL Intravenous Once    levothyroxine  75 mcg Oral Daily    megestrol  20 mg Oral Daily    sodium chloride (PF)  3 mL Intracatheter Q8H    tranexamic acid  1,300 mg Oral BID       Physical Exam   Temp: 98.4  F (36.9  C) Temp src: Oral BP: 98/71 Pulse: 62   Resp: 18 SpO2: 99 % O2 Device: None (Room air)    Vitals:    12/24/23 1849   Weight: 64.8 kg (142 lb 14.4 oz)     Vital Signs with Ranges  Temp:  [98.1  F (36.7  C)-99.1  F (37.3  C)] 98.4  F (36.9  C)  Pulse:  [51-69] 62  Resp:  [18-22] 18  BP: ()/(52-71) 98/71  SpO2:  [97 %-100 %] 99 %  No intake/output data recorded.    Chest / Breast:   RRR, CTAB     Abdomen:   No scars, normal bowel sounds, soft, non-distended, non-tender, no masses palpated, no hepatosplenomegally     Ext:  No C/C/E      Data   Recent Labs   Lab Test 12/24/23  1347   AS Negative     Recent Labs   Lab Test 12/26/23  0811 12/25/23  0747   HGB 8.3* 8.5*     No lab results found.    A/P 48 year old s/p HD3 here with UTI/bacteremia/sepsis and AUB-HMB     Bacteremia/sepsis/UTI  - managed per primary team     AUB  - pt aware she needs to follow up with Gyn as OP  - continue on Megace  - s/p 1 dose of TXA  - s/p 3 PRBCs     Covid infection  - management per primary team     Per primary team  - Home once blood culture susceptibilities are back with appropriate antibiotic to complete a 14 day course of treatment.     Carolina Chahal MD

## 2023-12-27 NOTE — PLAN OF CARE
"Goal Outcome Evaluation:      Plan of Care Reviewed With: patient        Precautions: COVID +  Labs/Protocols: Hbg 8.3  Vitals: /56 (BP Location: Left arm)   Pulse 61   Temp 99  F (37.2  C) (Oral)   Resp 22   Ht 1.549 m (5' 1\")   Wt 64.8 kg (142 lb 14.4 oz)   LMP 12/24/2023   SpO2 100%   BMI 27.00 kg/m    Cardiac: WNL  Respiratory: WNL, intermittent dry cough   Neuro: A/Ox4, [place] speaking, can understand/speak english,  in room   GI/: WNL  Skin: WNL  LDAs: PIV SL  Diet: Regular - needs help ordering   Activity: Ind  Pain: denies - sore throat from cough, no medication needed  Plan: IV abx, pain control, pending cultures - transition to PO at discharge             "

## 2023-12-27 NOTE — DISCHARGE SUMMARY
[facility] Discharge Summary    Mitali Alexander MRN# [id number]   Age: 48 year old YOB: 1975     Date of Admission:  12/24/2023  Date of Discharge::  12/27/2023  Admitting Physician:  Carolina Chahal MD  Discharge Physician:  Carolina Chahal MD     Home clinic: none          Admission Diagnoses:   Hypovolemic shock (H) [R57.1]  Syncope [R55]  Uterine fibroid [D25.9]  Abnormal vaginal bleeding [N93.9]  Anemia due to blood loss, acute [D62]  COVID-19 [U07.1]  ABLA          Discharge Diagnosis:     same          Procedures:     Procedure(s): Antibiotics  Transfusion 3U pRBCs       No procedures performed during this admission           Medications Prior to Admission:     Medications Prior to Admission   Medication Sig Dispense Refill Last Dose    acetaminophen (TYLENOL) 500 MG tablet Take 1 tablet by mouth as needed   12/23/2023    DM-Doxylamine-Acetaminophen (NYQUIL COLD & FLU PO) Take by mouth as needed   12/23/2023    levothyroxine (SYNTHROID/LEVOTHROID) 75 MCG tablet Take 75 mcg by mouth daily   12/23/2023             Discharge Medications:     Current Discharge Medication List        START taking these medications    Details   benzonatate (TESSALON) 200 MG capsule Take 1 capsule (200 mg) by mouth 3 times daily as needed for cough  Qty: 30 capsule, Refills: 0    Associated Diagnoses: COVID-19      ciprofloxacin (CIPRO) 500 MG tablet Take 1 tablet (500 mg) by mouth 2 times daily for 14 days  Qty: 28 tablet, Refills: 0    Associated Diagnoses: Bacteremia      guaiFENesin (MUCINEX) 600 MG 12 hr tablet Take 2 tablets (1,200 mg) by mouth 2 times daily for 14 days  Qty: 56 tablet, Refills: 0    Associated Diagnoses: COVID-19      megestrol (MEGACE) 20 MG tablet Take 1 tablet (20 mg) by mouth daily  Qty: 30 tablet, Refills: 1    Associated Diagnoses: Anemia due to blood loss, acute           CONTINUE these medications which have NOT CHANGED    Details   acetaminophen (TYLENOL) 500 MG  tablet Take 1 tablet by mouth as needed      DM-Doxylamine-Acetaminophen (NYQUIL COLD & FLU PO) Take by mouth as needed      levothyroxine (SYNTHROID/LEVOTHROID) 75 MCG tablet Take 75 mcg by mouth daily                   Consultations:   Consultation during this admission received from internal medicine          Brief History of Admission:   Mitali Alexander is a 48 year old female who presents with menorrhagia, oligomenorrhea, and fainting. Patient states that she is currently on her period and experiencing heavy flow with blood clots.  States that she typically gets her menstrual cycle every 6 months now and they are always this heavy, lasting about a week, with large clots. She wears an adult diaper to control the flow. There is no intramenstrual spotting or bleeding.  She denies any significant abdominal discomfort. Hb 4.8 in ED.     Additionally, she had a syncopal episode while using the restroom, urinating, when she was changing her pad and had a syncopal episode.  Denies hitting her head.  This has not happened before.  Patient had a viral illness about 1 week ago but symptoms improved.  Patient and family went on a trip to [place] for 3 days and now since returning, patient has been feeling unwell.  Patient has had low appetite and episodes of emesis for the past 2 days.  Also worsening cough that is dry in nature.  Patient endorses body aches and headache but no known fevers.  Bowel movements have been normal.  She denies any urinary symptoms.  Denies chest pain but does feel short of breath.  Daughter states that she appears very pale today.             Hospital Course:   The patient's hospital course was unremarkable.  On discharge, her pain was minimal. Vaginal bleeding is scant. Voiding without difficulty.  Ambulating well and tolerating a normal diet.  No fever.  Coughing.     Post-partum hemoglobin:   Hemoglobin   Date Value Ref Range Status   12/27/2023 8.4 (L) 11.7 - 15.7 g/dL Final   03/31/2017 12.3  11.7 - 15.7 g/dL Final             Discharge Instructions and Follow-Up:     Discharge diet: Regular   Discharge activity: Activity as tolerated   Discharge follow-up: Follow up with primary care provider in 1 weeks   Wound care: Drink plenty of fluids           Discharge Disposition:     Discharged to home       Carolina Chahal MD

## 2023-12-29 ENCOUNTER — PATIENT OUTREACH (OUTPATIENT)
Dept: CARE COORDINATION | Facility: CLINIC | Age: 48
End: 2023-12-29

## 2023-12-29 NOTE — PROGRESS NOTES
Connected Care Resource Center:   [facility] Resource Center Contact  [facility]/Voicemail     Clinical Data: Post-Discharge Outreach     Outreach attempted x 2.  Left message on patient's voicemail, providing [facility]'s central phone number of [phone] ([phone]) for questions/concerns and/or to schedule an appt with an [facility] provider, if they do not have a PCP.      Plan:  [facility] will do no further outreaches at this time.       Lise Jose MA  Connected Care Resource Center, [facility]    *Connected Care Resource Team does NOT follow patient ongoing. Referrals are identified based on internal discharge reports and the outreach is to ensure patient has an understanding of their discharge instructions.